# Patient Record
Sex: FEMALE | Race: WHITE | Employment: UNEMPLOYED | URBAN - NONMETROPOLITAN AREA
[De-identification: names, ages, dates, MRNs, and addresses within clinical notes are randomized per-mention and may not be internally consistent; named-entity substitution may affect disease eponyms.]

---

## 2021-10-01 ENCOUNTER — HOSPITAL ENCOUNTER (INPATIENT)
Age: 36
LOS: 10 days | Discharge: NURSING, REHAB OR LONG-TERM CARE | DRG: 751 | End: 2021-10-11
Attending: PSYCHIATRY & NEUROLOGY | Admitting: PSYCHIATRY & NEUROLOGY
Payer: MEDICAID

## 2021-10-01 DIAGNOSIS — G89.29 OTHER CHRONIC PAIN: ICD-10-CM

## 2021-10-01 DIAGNOSIS — F33.2 SEVERE EPISODE OF RECURRENT MAJOR DEPRESSIVE DISORDER, WITHOUT PSYCHOTIC FEATURES (HCC): ICD-10-CM

## 2021-10-01 DIAGNOSIS — F41.1 GAD (GENERALIZED ANXIETY DISORDER): ICD-10-CM

## 2021-10-01 DIAGNOSIS — F98.8 ATTENTION DEFICIT DISORDER (ADD) WITHOUT HYPERACTIVITY: Primary | ICD-10-CM

## 2021-10-01 PROBLEM — R56.9 SEIZURES, GENERALIZED CONVULSIVE (HCC): Status: ACTIVE | Noted: 2021-10-01

## 2021-10-01 PROBLEM — F32.A DEPRESSION WITH SUICIDAL IDEATION: Status: ACTIVE | Noted: 2021-10-01

## 2021-10-01 PROBLEM — R45.851 DEPRESSION WITH SUICIDAL IDEATION: Status: ACTIVE | Noted: 2021-10-01

## 2021-10-01 PROCEDURE — 74011250637 HC RX REV CODE- 250/637: Performed by: PSYCHIATRY & NEUROLOGY

## 2021-10-01 PROCEDURE — 65220000003 HC RM SEMIPRIVATE PSYCH

## 2021-10-01 RX ORDER — LEVETIRACETAM 250 MG/1
250 TABLET ORAL 2 TIMES DAILY
COMMUNITY
End: 2021-10-11

## 2021-10-01 RX ORDER — BENZTROPINE MESYLATE 1 MG/1
1 TABLET ORAL
Status: DISCONTINUED | OUTPATIENT
Start: 2021-10-01 | End: 2021-10-01 | Stop reason: CLARIF

## 2021-10-01 RX ORDER — BUSPIRONE HYDROCHLORIDE 10 MG/1
10 TABLET ORAL 2 TIMES DAILY
Status: DISCONTINUED | OUTPATIENT
Start: 2021-10-01 | End: 2021-10-11 | Stop reason: HOSPADM

## 2021-10-01 RX ORDER — OXCARBAZEPINE 150 MG/1
300 TABLET, FILM COATED ORAL 2 TIMES DAILY
COMMUNITY

## 2021-10-01 RX ORDER — OLANZAPINE 2.5 MG/1
5 TABLET ORAL
Status: DISCONTINUED | OUTPATIENT
Start: 2021-10-01 | End: 2021-10-11 | Stop reason: HOSPADM

## 2021-10-01 RX ORDER — QUETIAPINE FUMARATE 200 MG/1
400 TABLET, FILM COATED ORAL
Status: DISCONTINUED | OUTPATIENT
Start: 2021-10-01 | End: 2021-10-11 | Stop reason: HOSPADM

## 2021-10-01 RX ORDER — MIRTAZAPINE 15 MG/1
30 TABLET, FILM COATED ORAL
Status: COMPLETED | OUTPATIENT
Start: 2021-10-01 | End: 2021-10-07

## 2021-10-01 RX ORDER — CLONIDINE HYDROCHLORIDE 0.1 MG/1
0.1 TABLET ORAL
COMMUNITY

## 2021-10-01 RX ORDER — ACETAMINOPHEN 325 MG/1
650 TABLET ORAL
Status: DISCONTINUED | OUTPATIENT
Start: 2021-10-01 | End: 2021-10-11 | Stop reason: HOSPADM

## 2021-10-01 RX ORDER — BENZTROPINE MESYLATE 0.5 MG/1
0.5 TABLET ORAL 2 TIMES DAILY
Status: COMPLETED | OUTPATIENT
Start: 2021-10-01 | End: 2021-10-07

## 2021-10-01 RX ORDER — GABAPENTIN 300 MG/1
600 CAPSULE ORAL 3 TIMES DAILY
Status: COMPLETED | OUTPATIENT
Start: 2021-10-01 | End: 2021-10-08

## 2021-10-01 RX ORDER — CLONAZEPAM 1 MG/1
1 TABLET ORAL 2 TIMES DAILY
Status: DISCONTINUED | OUTPATIENT
Start: 2021-10-01 | End: 2021-10-11 | Stop reason: HOSPADM

## 2021-10-01 RX ORDER — HYDROXYZINE 25 MG/1
50 TABLET, FILM COATED ORAL
Status: DISCONTINUED | OUTPATIENT
Start: 2021-10-01 | End: 2021-10-11 | Stop reason: HOSPADM

## 2021-10-01 RX ORDER — GABAPENTIN 600 MG/1
600 TABLET ORAL 3 TIMES DAILY
COMMUNITY
End: 2021-10-11

## 2021-10-01 RX ORDER — QUETIAPINE FUMARATE 25 MG/1
25 TABLET, FILM COATED ORAL DAILY
COMMUNITY
End: 2021-10-11

## 2021-10-01 RX ORDER — ADHESIVE BANDAGE
30 BANDAGE TOPICAL DAILY PRN
Status: DISCONTINUED | OUTPATIENT
Start: 2021-10-01 | End: 2021-10-11 | Stop reason: HOSPADM

## 2021-10-01 RX ORDER — QUETIAPINE FUMARATE 400 MG/1
400 TABLET, FILM COATED ORAL
COMMUNITY
End: 2021-10-11

## 2021-10-01 RX ORDER — LEVETIRACETAM 250 MG/1
250 TABLET ORAL 2 TIMES DAILY
Status: DISCONTINUED | OUTPATIENT
Start: 2021-10-01 | End: 2021-10-11 | Stop reason: HOSPADM

## 2021-10-01 RX ORDER — DEXTROAMPHETAMINE SACCHARATE, AMPHETAMINE ASPARTATE, DEXTROAMPHETAMINE SULFATE AND AMPHETAMINE SULFATE 2.5; 2.5; 2.5; 2.5 MG/1; MG/1; MG/1; MG/1
20 TABLET ORAL 3 TIMES DAILY
Status: DISCONTINUED | OUTPATIENT
Start: 2021-10-01 | End: 2021-10-02 | Stop reason: DRUGHIGH

## 2021-10-01 RX ORDER — LORAZEPAM 2 MG/ML
1 INJECTION INTRAMUSCULAR
Status: DISCONTINUED | OUTPATIENT
Start: 2021-10-01 | End: 2021-10-11 | Stop reason: HOSPADM

## 2021-10-01 RX ORDER — BENZTROPINE MESYLATE 0.5 MG/1
0.5 TABLET ORAL 2 TIMES DAILY
COMMUNITY

## 2021-10-01 RX ORDER — TRAZODONE HYDROCHLORIDE 50 MG/1
50 TABLET ORAL
Status: DISCONTINUED | OUTPATIENT
Start: 2021-10-01 | End: 2021-10-11 | Stop reason: HOSPADM

## 2021-10-01 RX ORDER — DEXTROAMPHETAMINE SACCHARATE, AMPHETAMINE ASPARTATE, DEXTROAMPHETAMINE SULFATE AND AMPHETAMINE SULFATE 5; 5; 5; 5 MG/1; MG/1; MG/1; MG/1
20 TABLET ORAL 3 TIMES DAILY
COMMUNITY
End: 2021-10-11

## 2021-10-01 RX ORDER — IBUPROFEN 200 MG
1 TABLET ORAL DAILY
Status: DISCONTINUED | OUTPATIENT
Start: 2021-10-01 | End: 2021-10-11 | Stop reason: HOSPADM

## 2021-10-01 RX ORDER — DIPHENHYDRAMINE HYDROCHLORIDE 50 MG/ML
50 INJECTION, SOLUTION INTRAMUSCULAR; INTRAVENOUS
Status: DISCONTINUED | OUTPATIENT
Start: 2021-10-01 | End: 2021-10-11 | Stop reason: HOSPADM

## 2021-10-01 RX ADMIN — BUSPIRONE HYDROCHLORIDE 10 MG: 10 TABLET ORAL at 14:57

## 2021-10-01 RX ADMIN — MIRTAZAPINE 30 MG: 15 TABLET, FILM COATED ORAL at 21:11

## 2021-10-01 RX ADMIN — QUETIAPINE FUMARATE 400 MG: 200 TABLET ORAL at 21:11

## 2021-10-01 RX ADMIN — DEXTROAMPHETAMINE SACCHARATE, AMPHETAMINE ASPARTATE, DEXTROAMPHETAMINE SULFATE, AMPHETAMINE SULFATE TABLETS, 10 MG,CLL 20 MG: 2.5; 2.5; 2.5; 2.5 TABLET ORAL at 16:02

## 2021-10-01 RX ADMIN — BENZTROPINE MESYLATE 0.5 MG: 0.5 TABLET ORAL at 14:56

## 2021-10-01 RX ADMIN — LEVETIRACETAM 250 MG: 250 TABLET ORAL at 21:11

## 2021-10-01 RX ADMIN — CLONAZEPAM 1 MG: 1 TABLET ORAL at 21:11

## 2021-10-01 RX ADMIN — LEVETIRACETAM 250 MG: 250 TABLET ORAL at 15:00

## 2021-10-01 RX ADMIN — GABAPENTIN 600 MG: 300 CAPSULE ORAL at 21:11

## 2021-10-01 RX ADMIN — GABAPENTIN 600 MG: 300 CAPSULE ORAL at 14:56

## 2021-10-01 RX ADMIN — BENZTROPINE MESYLATE 0.5 MG: 0.5 TABLET ORAL at 21:11

## 2021-10-01 RX ADMIN — CLONAZEPAM 1 MG: 1 TABLET ORAL at 14:57

## 2021-10-01 RX ADMIN — DEXTROAMPHETAMINE SACCHARATE, AMPHETAMINE ASPARTATE, DEXTROAMPHETAMINE SULFATE, AMPHETAMINE SULFATE TABLETS, 10 MG,CLL 20 MG: 2.5; 2.5; 2.5; 2.5 TABLET ORAL at 21:10

## 2021-10-01 RX ADMIN — BUSPIRONE HYDROCHLORIDE 10 MG: 10 TABLET ORAL at 21:11

## 2021-10-01 RX ADMIN — ACETAMINOPHEN 650 MG: 325 TABLET ORAL at 23:39
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
## 2021-10-02 LAB
CHOLEST SERPL-MCNC: 162 MG/DL
EST. AVERAGE GLUCOSE BLD GHB EST-MCNC: 97 MG/DL
HBA1C MFR BLD: 5 % (ref 4–5.6)
HDLC SERPL-MCNC: 53 MG/DL
HDLC SERPL: 3.1 {RATIO} (ref 0–5)
LDLC SERPL CALC-MCNC: 76 MG/DL (ref 0–100)
LIPID PROFILE,FLP: ABNORMAL
TRIGL SERPL-MCNC: 165 MG/DL (ref ?–150)
VLDLC SERPL CALC-MCNC: 33 MG/DL

## 2021-10-02 PROCEDURE — 74011250637 HC RX REV CODE- 250/637: Performed by: INTERNAL MEDICINE

## 2021-10-02 PROCEDURE — 83036 HEMOGLOBIN GLYCOSYLATED A1C: CPT

## 2021-10-02 PROCEDURE — 65220000003 HC RM SEMIPRIVATE PSYCH

## 2021-10-02 PROCEDURE — 36415 COLL VENOUS BLD VENIPUNCTURE: CPT

## 2021-10-02 PROCEDURE — 80061 LIPID PANEL: CPT

## 2021-10-02 PROCEDURE — 74011250637 HC RX REV CODE- 250/637: Performed by: PSYCHIATRY & NEUROLOGY

## 2021-10-02 RX ORDER — BUTALBITAL, ACETAMINOPHEN AND CAFFEINE 50; 325; 40 MG/1; MG/1; MG/1
2 TABLET ORAL
Status: COMPLETED | OUTPATIENT
Start: 2021-10-02 | End: 2021-10-02

## 2021-10-02 RX ORDER — NAPROXEN 250 MG/1
500 TABLET ORAL
Status: DISCONTINUED | OUTPATIENT
Start: 2021-10-02 | End: 2021-10-03

## 2021-10-02 RX ADMIN — CLONAZEPAM 1 MG: 1 TABLET ORAL at 21:11

## 2021-10-02 RX ADMIN — BENZTROPINE MESYLATE 0.5 MG: 0.5 TABLET ORAL at 21:11

## 2021-10-02 RX ADMIN — CLONAZEPAM 1 MG: 1 TABLET ORAL at 07:43

## 2021-10-02 RX ADMIN — HYDROXYZINE HYDROCHLORIDE 50 MG: 25 TABLET, FILM COATED ORAL at 14:41

## 2021-10-02 RX ADMIN — BUTALBITAL, ACETAMINOPHEN, AND CAFFEINE 2 TABLET: 50; 325; 40 TABLET ORAL at 15:41

## 2021-10-02 RX ADMIN — ACETAMINOPHEN 650 MG: 325 TABLET ORAL at 08:08

## 2021-10-02 RX ADMIN — ACETAMINOPHEN 650 MG: 325 TABLET ORAL at 13:18

## 2021-10-02 RX ADMIN — DEXTROAMPHETAMINE SACCHARATE, AMPHETAMINE ASPARTATE, DEXTROAMPHETAMINE SULFATE, AMPHETAMINE SULFATE TABLETS, 10 MG,CLL 20 MG: 2.5; 2.5; 2.5; 2.5 TABLET ORAL at 15:43

## 2021-10-02 RX ADMIN — BUSPIRONE HYDROCHLORIDE 10 MG: 10 TABLET ORAL at 21:10

## 2021-10-02 RX ADMIN — GABAPENTIN 600 MG: 300 CAPSULE ORAL at 21:10

## 2021-10-02 RX ADMIN — GABAPENTIN 600 MG: 300 CAPSULE ORAL at 07:43

## 2021-10-02 RX ADMIN — GABAPENTIN 600 MG: 300 CAPSULE ORAL at 15:42

## 2021-10-02 RX ADMIN — MIRTAZAPINE 30 MG: 15 TABLET, FILM COATED ORAL at 21:11

## 2021-10-02 RX ADMIN — LEVETIRACETAM 250 MG: 250 TABLET ORAL at 21:10

## 2021-10-02 RX ADMIN — BENZTROPINE MESYLATE 0.5 MG: 0.5 TABLET ORAL at 07:44

## 2021-10-02 RX ADMIN — QUETIAPINE FUMARATE 400 MG: 200 TABLET ORAL at 21:11

## 2021-10-02 RX ADMIN — LEVETIRACETAM 250 MG: 250 TABLET ORAL at 07:44

## 2021-10-02 RX ADMIN — BUSPIRONE HYDROCHLORIDE 10 MG: 10 TABLET ORAL at 07:45

## 2021-10-02 NOTE — BH NOTES
Pt since 2300 was given at least 4 sanitary pads,  She claimed having   Heavy mesturation. I checks the trashed pads she was putting in the trash, no blood seen just some blood drops scant on one pad the rest of the two pads there is no blood at all in. Pt behavior is under observation.

## 2021-10-02 NOTE — BH NOTES
Pt has been out of room in groups room today. Up to nurses's station numerous times making requests for meds, drinks and to make calls to [facility]. Pt has asked for increasing amount of sanitary pads although she cannot show staff that she is bleeding heavily. Has complained of headache much of the day and was seen by Dr Demetrius Garcia and meds were ordered. Safe on unit will continue to monitor.

## 2021-10-02 NOTE — PROGRESS NOTES
Pt is progressing toward her goals. Denies suicidal and homicidal thoughts, says depression and anxiety is improving. Compliant with meds, and attending groups. No seizure activity noted. Safe on unit.

## 2021-10-02 NOTE — PROGRESS NOTES
Problem: Falls - Risk of  Goal: *Absence of Falls  Description: Document Burns Reason Fall Risk and appropriate interventions in the flowsheet.   Outcome: Progressing Towards Goal  Note: Fall Risk Interventions:            Medication Interventions: Teach patient to arise slowly

## 2021-10-02 NOTE — BH NOTES
Pt received in her room socializing with her roommate, smiling, euthymic mood,  Denies SI/HI, Denies hallucinations, describe d her mood as oK. Pt attended. group rated depression as 5 and anxiety as 8, ate snack, reported by staff that pt doesn't like red meat on her meals and snacks( ordered for diet and snack modified and a comment added addressing her preferences). Pt later asking for Ensure  After snacks. because she missed her lunch, directed to discuss that with doctor as pt has  Demanding behaviors for her needs but in polite manner. Remained after group  In activity room watching Tv and socilaizing and talking to her roommate  Till almost 65  Went to her room, demand for gown feeling cold, then asked for underwear and  Sanitary pads saying she is on her period. she reported having L arm implant for birth control. At [id number] requested and given po prn Tyelnol 650mg for abdominal amenstrual cramps before taking the Tylenol she asked if we give Ultram or advil if this doesn't work, directed to discuss her medications with the doctor when will be seen by the doctor, also encouraged to sleep she said she will be up as she slept a lot in the other hospital before coming here. .  Current;y she is awake sitting in bed.

## 2021-10-02 NOTE — BH NOTES
B: Pt is awake and alert this morning, pleasant and cooperative with staff and peers. Denies suicidal and homicidal thoughts, rates depression and anxiety at 5/10. Eating and sleeping well, compliant with meds. No seizure activity noted. Safe on unit. I: Maintain a safe environment for pt, safety cks q 15 mins and prn, Encourage groups and give meds as ordered. Monitor hours of sleep at night and amount of food consumed at meals. R: Pt is pleasant with staff and peers. States she is doing well this morning. Denies suicidal and homicidal thoughts. Safe on unit. P: Continue to monitor, give meds as ordered, encourage groups.
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
## 2021-10-02 NOTE — BH NOTES
Pt start sleeping by 0200 am remained sleeping as of this time. No violent, no slef harming behaviors, no seizure like activity noticed or reported.

## 2021-10-03 PROCEDURE — 74011250637 HC RX REV CODE- 250/637: Performed by: PSYCHIATRY & NEUROLOGY

## 2021-10-03 PROCEDURE — 65220000003 HC RM SEMIPRIVATE PSYCH

## 2021-10-03 RX ORDER — DEXTROAMPHETAMINE SACCHARATE, AMPHETAMINE ASPARTATE, DEXTROAMPHETAMINE SULFATE AND AMPHETAMINE SULFATE 2.5; 2.5; 2.5; 2.5 MG/1; MG/1; MG/1; MG/1
20 TABLET ORAL 2 TIMES DAILY
Status: DISCONTINUED | OUTPATIENT
Start: 2021-10-03 | End: 2021-10-11 | Stop reason: HOSPADM

## 2021-10-03 RX ORDER — NAPROXEN 375 MG/1
375 TABLET ORAL
Status: DISCONTINUED | OUTPATIENT
Start: 2021-10-03 | End: 2021-10-11 | Stop reason: HOSPADM

## 2021-10-03 RX ADMIN — LEVETIRACETAM 250 MG: 250 TABLET ORAL at 08:28

## 2021-10-03 RX ADMIN — QUETIAPINE FUMARATE 400 MG: 200 TABLET ORAL at 21:18

## 2021-10-03 RX ADMIN — CLONAZEPAM 1 MG: 1 TABLET ORAL at 21:20

## 2021-10-03 RX ADMIN — BUSPIRONE HYDROCHLORIDE 10 MG: 10 TABLET ORAL at 21:19

## 2021-10-03 RX ADMIN — GABAPENTIN 600 MG: 300 CAPSULE ORAL at 15:23

## 2021-10-03 RX ADMIN — BENZTROPINE MESYLATE 0.5 MG: 0.5 TABLET ORAL at 08:28

## 2021-10-03 RX ADMIN — GABAPENTIN 600 MG: 300 CAPSULE ORAL at 08:29

## 2021-10-03 RX ADMIN — LEVETIRACETAM 250 MG: 250 TABLET ORAL at 21:19

## 2021-10-03 RX ADMIN — TRAZODONE HYDROCHLORIDE 50 MG: 50 TABLET ORAL at 23:13

## 2021-10-03 RX ADMIN — MIRTAZAPINE 30 MG: 15 TABLET, FILM COATED ORAL at 21:20

## 2021-10-03 RX ADMIN — BUSPIRONE HYDROCHLORIDE 10 MG: 10 TABLET ORAL at 08:29

## 2021-10-03 RX ADMIN — CLONAZEPAM 1 MG: 1 TABLET ORAL at 08:29

## 2021-10-03 RX ADMIN — BENZTROPINE MESYLATE 0.5 MG: 0.5 TABLET ORAL at 21:19

## 2021-10-03 RX ADMIN — DEXTROAMPHETAMINE SACCHARATE, AMPHETAMINE ASPARTATE, DEXTROAMPHETAMINE SULFATE, AMPHETAMINE SULFATE TABLETS, 10 MG,CLL 20 MG: 2.5; 2.5; 2.5; 2.5 TABLET ORAL at 09:09

## 2021-10-03 RX ADMIN — GABAPENTIN 600 MG: 300 CAPSULE ORAL at 21:18

## 2021-10-03 RX ADMIN — DEXTROAMPHETAMINE SACCHARATE, AMPHETAMINE ASPARTATE, DEXTROAMPHETAMINE SULFATE, AMPHETAMINE SULFATE TABLETS, 10 MG,CLL 20 MG: 2.5; 2.5; 2.5; 2.5 TABLET ORAL at 13:22

## 2021-10-03 NOTE — PROGRESS NOTES
Pt is progressing toward her goals. Denies suicidal and homicidal thoughts. States she is not depressed but is anxious about being here. Compliant with meds and is attending groups. Safe on unit.

## 2021-10-03 NOTE — BH NOTES
B: Pt is awake and alert this morning. Out to nurse's station with angry mood and and states she wants to be discharged. Pt states she has no home to go to but can fly to [place] or [facility] gap go to [employer]". Explained to pt that we cannot discharge her without a doctor's order and no plan as to where she will go. Explained that Dr Saida Fiore with probably order a prescreen on her. Pt irritable at states \"Just forget it\"! Denies suicidal and homicidal thoughts, says she is feeling trapped here. Compliant with meds, irritable with staff, safe on unit. I: Maintain a safe environment for pt, safety cks q 15 mins and prn. Encourage groups and give meds as ordered. R: Pt is irritable and manipulative with staff and peers. Turned coffee over on tray and   then asked staff to call dietary to bring more. Asking staff to give her meds at 0600 because peer receives med at this time. Pt redirected, angry and went to her room. Safe on unit. P: Continue to monitor, encourage groups, give meds as ordered.

## 2021-10-03 NOTE — BH NOTES
Pt has been out and about on unit today. Continues to make numerous requests and frequently comes to nurse's starion to ask questions. Denies suicidal and homicidal thoughts, safe on unit.

## 2021-10-03 NOTE — BH NOTES
B: Pt alert and oriented x4; Pt denied feeling dep, anxious, SI/HI, and A/V/H. Pt cooperative with assessment but noted very needy and intrusive. During reviewing her medications, noted Adderall 20mg scheduled for TID and last dose to be given at 2200. T/C to Dr. Shoaib Franklin and medication was changed to Adderall 10mg BID. Explained to pt which cause her to because somewhat upset because she felt has though she may have withdrawal symptoms because the dose was decrease and proceeded to question if she could leave because she was voluntary and could I do to make that happen. Explained to give me a few mins and I would contact Dr. Shoaib Franklin. T/C Shoaib Franklin and explained the situation and he immediately changed the order to Adderall 20mg BID. Spoke with pt and she felt better and would stay. Pt monitored [id number]     I: Encourage pt to attend and participate in all groups and wrap up; Administer medication as ordered and  needed. Encourage pt be up for all meals and snacks. Encourage pt to interact with staff/peers in a positive manner; Continue q15 min safety checks     R: Pt completed  group and participated, consumed snack. Pt interacts well with all her peers and staff. Pt can be very intrusive with roommate, note letting her speak up for herself. Pt medication administer. Denies any pain Pt monitored [id number]     P: Pt will develop and utilize positive coping skills. Pt monitored [id number]    2230: pt comes to the nursing station asking if her Klonopn 1mg could be changed to breakfast and dinner because she has more anxiety between those times. Expressed the Klonopin is still in her system. Then pt stated \" I have migraines and it's causing my stomach to because upset and my legs.  Asked didn't she want Tylenol and she just walked away saying \"nevermind\"    0500: pt slept throughout the night without any issue

## 2021-10-04 PROCEDURE — 65220000003 HC RM SEMIPRIVATE PSYCH

## 2021-10-04 PROCEDURE — 74011250637 HC RX REV CODE- 250/637: Performed by: PSYCHIATRY & NEUROLOGY

## 2021-10-04 RX ADMIN — GABAPENTIN 600 MG: 300 CAPSULE ORAL at 15:52

## 2021-10-04 RX ADMIN — GABAPENTIN 600 MG: 300 CAPSULE ORAL at 21:13

## 2021-10-04 RX ADMIN — LEVETIRACETAM 250 MG: 250 TABLET ORAL at 21:13

## 2021-10-04 RX ADMIN — BUSPIRONE HYDROCHLORIDE 10 MG: 10 TABLET ORAL at 21:14

## 2021-10-04 RX ADMIN — NAPROXEN 375 MG: 375 TABLET ORAL at 13:36

## 2021-10-04 RX ADMIN — BUSPIRONE HYDROCHLORIDE 10 MG: 10 TABLET ORAL at 08:32

## 2021-10-04 RX ADMIN — DEXTROAMPHETAMINE SACCHARATE, AMPHETAMINE ASPARTATE, DEXTROAMPHETAMINE SULFATE, AMPHETAMINE SULFATE TABLETS, 10 MG,CLL 20 MG: 2.5; 2.5; 2.5; 2.5 TABLET ORAL at 13:35

## 2021-10-04 RX ADMIN — CLONAZEPAM 1 MG: 1 TABLET ORAL at 21:14

## 2021-10-04 RX ADMIN — QUETIAPINE FUMARATE 400 MG: 200 TABLET ORAL at 21:13

## 2021-10-04 RX ADMIN — MIRTAZAPINE 30 MG: 15 TABLET, FILM COATED ORAL at 21:14

## 2021-10-04 RX ADMIN — DEXTROAMPHETAMINE SACCHARATE, AMPHETAMINE ASPARTATE, DEXTROAMPHETAMINE SULFATE, AMPHETAMINE SULFATE TABLETS, 10 MG,CLL 20 MG: 2.5; 2.5; 2.5; 2.5 TABLET ORAL at 08:32

## 2021-10-04 RX ADMIN — HYDROXYZINE HYDROCHLORIDE 50 MG: 25 TABLET, FILM COATED ORAL at 11:02

## 2021-10-04 RX ADMIN — GABAPENTIN 600 MG: 300 CAPSULE ORAL at 08:32

## 2021-10-04 RX ADMIN — BENZTROPINE MESYLATE 0.5 MG: 0.5 TABLET ORAL at 21:13

## 2021-10-04 RX ADMIN — LEVETIRACETAM 250 MG: 250 TABLET ORAL at 08:32

## 2021-10-04 RX ADMIN — BENZTROPINE MESYLATE 0.5 MG: 0.5 TABLET ORAL at 08:32

## 2021-10-04 RX ADMIN — CLONAZEPAM 1 MG: 1 TABLET ORAL at 08:32

## 2021-10-04 NOTE — PROGRESS NOTES
Problem: Depressed Mood (Adult/Pediatric)  Goal: *STG: Participates in treatment plan  Outcome: Progressing Towards Goal  Goal: *STG: Attends activities and groups  Outcome: Progressing Towards Goal  Goal: *STG: Remains safe in hospital  Outcome: Progressing Towards Goal  Goal: *STG: Complies with medication therapy  Outcome: Progressing Towards Goal     Problem: Depressed Mood (Adult/Pediatric)  Goal: *LTG: Understands illness and can identify signs of relapse  Outcome: Not Progressing Towards Goal     Problem: Anxiety  Goal: *Alleviation of anxiety  Outcome: Not Progressing Towards Goal
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
## 2021-10-04 NOTE — BH NOTES
Pt. Alert and oriented x 3. Physical Assessment was done et wnl. Pt. C/o headache. Rated headache 4/10. Pt. Relaxing, reading a book. Pt. Calm et cooperative. Pt. States depression is a 4/10. Pt. States anxiety is 7/10. Pt. Denies having  hallucinations. Pt. Denies SI/HI. I: Administer medications as ordered and needed, Encourage pt to attend and participate in groups, encourage pt to be up for all meals and snacks, consuming all each time, encourage pt to interact with peers in a positive manner. Q 15 minute safety checks continue. R: Pt. Is compliant with medications. does attend groups, does participate. Pt. is getting up for meals, consumes 100% of meals, snacks. Pt. does interact with peers. no safety concerns at this time. P:Will cont. To monitor q15min. Checks for safety. Administer medications as ordered et needed. Will cont. To encourage group attendance et participation. Will cont. To observe pt. For signs of  Outbursts. Will cont. To encourage pt. To use her coping skills.

## 2021-10-04 NOTE — BH NOTES
B: Pt alert and oriented x4; Noted pacing the hallway at a fast rate, upset, crying because she couldn't have her Klonopin early and slammed the bedroom door around 1900. Pt states dep 7/10, anxiety 9/10 because she needs her Klonopin, denies SI/HI, and A/V/H. Dr. Shoaib Franklin came in to visit pt. Pt requested her PRN medication list to be printed, explained that wasn't possible. Pt cooperative with assessment but noted very needy. Adderall order clarified today. Pt monitored [id number]     I: Encourage pt to attend and participate in all groups and wrap up; Administer medication as ordered and  needed. Encourage pt be up for all meals and snacks. Encourage pt to interact with staff/peers in a positive manner; Continue q15 min safety checks     R: Pt completed and partial participated, consumed snack. Pt interacts well with all her peers and staff. Pt can be very intrusive with roommate, note letting her speak up for herself. Pt medication administer.  Denies any pain Pt monitored [id number]     P: Pt will develop and utilize positive coping skills. Pt monitored [id number]      2315: pt c/o not sleeping and requested Trazodone 50mg for sleep    0500: pt slept through the night
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
## 2021-10-04 NOTE — PROGRESS NOTES
Discussed case interviewed patient  Taking and tolerating medications well  Reports still feeling depressed and anxious  Affect is labile  Reports feeling sad about anniversaries  Feels almost and no place to go  Important relationships ended  Does not see the role of substance abuse in her problems much  Reports feeling sad depressed  Denies acute psychotic symptoms    Mental status exam  Alert oriented fair eye contact  Speech is goal-directed soft tone  Mood is depressed  Tearful sad  Unable to pledge for safety outside the hospital  Insight and judgment fair    Recommendations  Patient appears generally sad depressed anxious  Unfortunately does not see the role of substance abuse in her difficulties as much  Medication regimen reviewed  Follow-up with me in the team again tomorrow

## 2021-10-04 NOTE — BH NOTES
Pt. Has been visible on the unit today, ambulating in the halls. Pt. Ate meals in the [address] Room. Pt. Attended et participated in group meetings. Pt. Interacted well with staff et peers. Will cont. To monitor q15min. Checks for safety.

## 2021-10-04 NOTE — PROGRESS NOTES
Pt up to nurses station, requesting \"nasal spray\". Pt states she get nasal congestion when lying down, states she has had this for a long time and uses \"generic Afrin\" at home. Pt denies prescription. Pt denies cold symptoms, states congestion is position dependant and occurs when lying down. Hospitalist called, awaiting answer.

## 2021-10-05 PROBLEM — R45.851 SUICIDAL IDEATION: Status: ACTIVE | Noted: 2021-10-05

## 2021-10-05 PROBLEM — F41.1 GAD (GENERALIZED ANXIETY DISORDER): Status: ACTIVE | Noted: 2021-10-05

## 2021-10-05 PROBLEM — F33.2 SEVERE EPISODE OF RECURRENT MAJOR DEPRESSIVE DISORDER (HCC): Status: ACTIVE | Noted: 2021-10-05

## 2021-10-05 PROCEDURE — 65220000003 HC RM SEMIPRIVATE PSYCH

## 2021-10-05 PROCEDURE — 74011250637 HC RX REV CODE- 250/637: Performed by: PSYCHIATRY & NEUROLOGY

## 2021-10-05 RX ADMIN — DEXTROAMPHETAMINE SACCHARATE, AMPHETAMINE ASPARTATE, DEXTROAMPHETAMINE SULFATE, AMPHETAMINE SULFATE TABLETS, 10 MG,CLL 20 MG: 2.5; 2.5; 2.5; 2.5 TABLET ORAL at 13:28

## 2021-10-05 RX ADMIN — BENZTROPINE MESYLATE 0.5 MG: 0.5 TABLET ORAL at 21:15

## 2021-10-05 RX ADMIN — DEXTROAMPHETAMINE SACCHARATE, AMPHETAMINE ASPARTATE, DEXTROAMPHETAMINE SULFATE, AMPHETAMINE SULFATE TABLETS, 10 MG,CLL 20 MG: 2.5; 2.5; 2.5; 2.5 TABLET ORAL at 08:27

## 2021-10-05 RX ADMIN — QUETIAPINE FUMARATE 400 MG: 200 TABLET ORAL at 21:15

## 2021-10-05 RX ADMIN — NAPROXEN 375 MG: 375 TABLET ORAL at 12:59

## 2021-10-05 RX ADMIN — CLONAZEPAM 1 MG: 1 TABLET ORAL at 08:27

## 2021-10-05 RX ADMIN — CLONAZEPAM 1 MG: 1 TABLET ORAL at 21:15

## 2021-10-05 RX ADMIN — BUSPIRONE HYDROCHLORIDE 10 MG: 10 TABLET ORAL at 08:26

## 2021-10-05 RX ADMIN — GABAPENTIN 600 MG: 300 CAPSULE ORAL at 08:26

## 2021-10-05 RX ADMIN — LEVETIRACETAM 250 MG: 250 TABLET ORAL at 21:15

## 2021-10-05 RX ADMIN — BUSPIRONE HYDROCHLORIDE 10 MG: 10 TABLET ORAL at 21:15

## 2021-10-05 RX ADMIN — GABAPENTIN 600 MG: 300 CAPSULE ORAL at 21:14

## 2021-10-05 RX ADMIN — GABAPENTIN 600 MG: 300 CAPSULE ORAL at 15:48

## 2021-10-05 RX ADMIN — LEVETIRACETAM 250 MG: 250 TABLET ORAL at 08:26

## 2021-10-05 RX ADMIN — MIRTAZAPINE 30 MG: 15 TABLET, FILM COATED ORAL at 21:15

## 2021-10-05 RX ADMIN — BENZTROPINE MESYLATE 0.5 MG: 0.5 TABLET ORAL at 08:26

## 2021-10-05 RX ADMIN — HYDROXYZINE HYDROCHLORIDE 50 MG: 25 TABLET, FILM COATED ORAL at 17:37

## 2021-10-05 NOTE — BH NOTES
Pt received inactivity room watching TV, then noticed walking in hallway like excising , then joined group at group time ,Pt socializing with her roommate, smiling, euthymic mood,  Denies SI/HI, Denies hallucinations. At [facility] came asking for her medications to be given early at 2000 informed that when medication prepared as usual for all patient will be distributed  At [facility] medication time by 2100. She walked away voice no issues.       Pt attended. group rated depression as10 and anxiety as10, ate snack,Remained after group  In activity room watching Tv and socilaizing and talking to her roommate  Till almost 200 and to staff. at 2113 accepted her medication , without issue. after HS medication involved herself in reading while in bed,    No compliants made this shift as of this time. start sleeping by 2230. No violent no self harming behavior noticed or reported.

## 2021-10-05 NOTE — BH NOTES
Behavioral Health Treatment Team Note     Patient goal(s) for today: Contact father  Treatment team focus/goals: Engage in treatment, monitor medications, work for safe discharge    Progress note: Pt is a voluntary admission. Pt is AO x 4, with SI, denies HI, denies AVH, cooperative. Pt has good eye contact. Mood is depressed. Affect is depressed. Reports anxiety and depression 5/10. Thinking is logical. Pt reports she does not have a strong support system and expresses she does not feel the need for social relationships outside of her daughter. Pt reports she has been binging on methamphetamines. SW discussed treatment options and pt did not want to go to another treatment facility at this time. Pt is participating in groups and interacting well with other pts. Will continue to monitor medications and engage in treatment.      LOS:  4  Expected LOS: 7    Insurance info/prescription coverage:  [address]  Date of last family contact:  None  Family requesting physician contact today:  no  Discharge plan:  Crisis stabilization  Guns in the home:  no   Outpatient provider(s):  [facility] [employer]

## 2021-10-05 NOTE — PROGRESS NOTES
Patient actively participated in Spirituality Group about devotional time with God and how one starts their day; planting seeds and leaving a legacy learning from the experiences of our forefathers and mothers on [facility] unit. Rev.  Keara Lemons 31, [address]

## 2021-10-05 NOTE — GROUP NOTE
QUETA  GROUP DOCUMENTATION INDIVIDUAL                                                                          Group Therapy Note    Date: 10/4/2021    Group Start Time: 1400  Group End Time: 1500  Group Topic: Education Group - Inpatient    [facility] [address] GROUP DOCUMENTATION GROUP    Group Therapy Note    Group members participated in psychoeducational group on crisis planning. SW provided psychoeducation and informational handouts on signs of illness, crisis, coping strategies, and resources. Group members were introduced to the concept of a safety plan. Attendance: Attended    Patient's Goal:  Attendance    Interventions/techniques: Informed, Validated and Supported    Follows Directions:  Followed directions    Interactions: Interacted appropriately    Mental Status: Calm and Congruent    Behavior/appearance: Attentive and Cooperative    Goals Achieved: Able to engage in interactions and Able to listen to others          ROSIE Perea, Supervisee in Social Work

## 2021-10-05 NOTE — BH NOTES
Pt. Alert and oriented x 4. Pt. States depression is a 0. Pt. States anxiety is 4. Pt. Denies hallucinations. Denies SI/HI. Cooperative with assessment. Pt.  States she is doing \"okay\", Pt. Had episode where she got frustrated with peer being loud, went to her room and slammed her door. Upon writer checking on pt. She states she was trying to read and she couldn't hear herself think, and it got her agitated, and she realizes this is something she needs to work on as far as coping skills. I: Administer medications as ordered and needed, Encourage pt to attend and participate in groups, encourage pt to be up for all meals and snacks, consuming all each time, encourage pt to interact with peers in a positive manner. Q 15 minute safety checks continue. R: Compliant with medications. does attend groups, does participate. Pt. is getting up for meals, consumes all of meals, snacks. Pt. does interact with peers. no safety concerns at this time. P: Pt. Will continue to utilize positive coping skills.

## 2021-10-05 NOTE — PROGRESS NOTES
Problem: Falls - Risk of  Goal: *Absence of Falls  Description: Document Marley Staley Fall Risk and appropriate interventions in the flowsheet. Outcome: Progressing Towards Goal  Note: Fall Risk Interventions:            Medication Interventions: Teach patient to arise slowly    Elimination Interventions:  Toilet paper/wipes in reach

## 2021-10-05 NOTE — GROUP NOTE
QUETA  GROUP DOCUMENTATION INDIVIDUAL                                                                          Group Therapy Note    Date: 10/5/2021    Group Start Time: 1400  Group End Time: 1500  Group Topic: Education Group - Inpatient    [facility] [address] GROUP DOCUMENTATION GROUP    Group Therapy Note    Group members participated in psychoeducational group. SW provided psychoeducational interventions on [employer]. Techniques for rumination was discussed and group members participated in experiential learning.            Attendance: Did not attend      ROSIE Rhodes, Supervisee in Social Work

## 2021-10-05 NOTE — BH NOTES
PSYCHOSOCIAL ASSESSMENT  :Patient identifying info:   Noé Chi is a 31 y.o., female admitted 10/1/2021 12:14 PM     Presenting problem and precipitating factors: Pt is a voluntary admission. Pt is admitted for SI and reported anxiety. Pt reports a hx of suicidality with past attempts and hospitalizations. Pt reports recent life stressors including recent homelessness. Pt reports her   last August of a Myocardial Infarction which may be related to relapse. Pt reports her youngest child is now in custody of her in-laws. Pt reports she has been homeless since a recent breakup on Thursday. Mental status assessment: Pt is AO x 4, with SI, denies HI, denies AVH, cooperative. Pt has good eye contact. Mood is depressed. Affect is depressed. Reports anxiety and depression 7/10. Thinking is logical.     Strengths:  Verbal, cooperative    Collateral information: Dominic Luna: Pt arrived at [facility] in private vehicle. Pt reported that she did not feel safe on an outpatient basis and felt mental health was at risk. Pt reported SI without plan, but could not pledge safety outside an inpatient environment. Pt told staff, \"I am right there, and I need to prevent it from getting to the point I hurt myself. \"      Current psychiatric /substance abuse providers and contact info: [employer] [phone]    Previous psychiatric/substance abuse providers and response to treatment:  Pt reports a hx of multiple hospitalizations.  Hospitalization [facility] , [address] , [facility] 2014, [place] x 3 (, , ). Psychiatric: [place] (Pt states approximately 36 x's), [place], [place], [facility]    Family history of mental illness or substance abuse: Mother - unspecified mental illness    Substance abuse history: Pt reports a hx of prescription abuse up until her early 21's.   Pt reports she also abused alcohol as a teenager. Hx of methadone abuse. Pt reports she currently binges on meth. Social History     Tobacco Use    Smoking status: Current Every Day Smoker     Packs/day: 1.50     Types: Cigarettes    Smokeless tobacco: Never Used   Substance Use Topics    Alcohol use: Never       History of biomedical complications associated with substance abuse : None noted    Patient's current acceptance of treatment or motivation for change:  Accepts treatment. Desires to become stabilized and in her grief. Family constellation: Pt lived with her mother until age 8. Pt's father then took custody where she lived with him and her stepmother. She has 2 brothers, 1 step-sister, and 1 sister who . Pt's   last August.  She has 3 children and does not have custody of any. Is significant other involved? None      Describe support system: Pt reports her father is her support, but he lives in [facility]. Describe living arrangements and home environment:  Homeless    Health issues:   Hospital Problems  Never Reviewed        Codes Class Noted POA    Seizures, generalized convulsive ([facility] ICD-10-CM: R56.9  ICD-9-CM: 780.39  10/1/2021 Unknown        Depression with suicidal ideation ICD-10-CM: F32.A, R45.851  ICD-9-CM: 121, V62.84  10/1/2021 Unknown              Trauma history: Pt reports hx of trauma. Legal issues: DUI x 2, prescription fraud, contributing to the delinquency of a minor. History of  service: None    Financial status: Unemployed    Roman Catholic/cultural factors: None    Education/work history: Some college. Hx of .     Have you been licensed as a health care professional (current or ):   No  Leisure and recreation preferences:   Embroidery, reading, writing, sprinting, crafting  Describe coping skills: Hx of using drugs to cope    ROSIE Jackson, Supervisee in Social Work  10/4/2021    PSA PART II ADDITIONAL INFORMATION        Access To Fire [employer]: No    Substance Use: YES    Last Use: 10/1/2021    Type of Substance: Other Methamphetamine    Frequency of Use: Varies    Request to See : NO    If yes, notified : NO    Release of Information Signed: NO    Release of Information Signed For: None

## 2021-10-05 NOTE — GROUP NOTE
QUETA  GROUP DOCUMENTATION INDIVIDUAL                                                                          Group Therapy Note    Date: 10/5/2021    Group Start Time: 4726  Group End Time: 9881  Group Topic: Process Group - Inpatient    [facility] [address] [facility] GROUP DOCUMENTATION GROUP    Group Therapy Note    SW facilitated process group. Topic of discussion was, \"What does it mean to you to be happy? \"  Group members discussed their definition of happiness. SW read 10 common traits found in happy people and group members discussed. Attendance: Attended    Patient's Goal:  Attendance    Interventions/techniques: Informed and Validated    Follows Directions:  Followed directions    Interactions: Interacted appropriately    Mental Status: Calm and Congruent    Behavior/appearance: Attentive and Cooperative    Goals Achieved: Able to engage in interactions and Able to listen to others          ROSIE Stroud, Supervisee In Social Work

## 2021-10-05 NOTE — BH NOTES
Pt slept overnight, remained  remained sleeping as of this time. No violent, no slef harming behaviors, no seizure like activity noticed or reported.

## 2021-10-05 NOTE — PROGRESS NOTES
Psychiatric Progress Note    Patient: Julio Kumar MRN: [id number]  SSN: [id number]    YOB: 1985  Age: 31 y.o. Sex: female      Admit Date: 10/1/2021    LOS: 4 days     Subjective:     Julio Kumar  reports feeling mostly anxious, some depressed and indicating she was \"struggling\" with things . This trend has continued in her life having tried to work more than 7 times with a job. Denies SI/HI/AH/VH. No aggression or violence. Appropriately interactive and aware. Tolerating medications well. Eating well and sleeping well. Case reviewed with nursing staff and no significant issues or events reported in the last 24 hours. Staff has observed patient interacting on the unit and attending group.     Objective:     Vitals:    10/04/21 0604 10/04/21 1552 10/05/21 0606 10/05/21 1557   BP: (!) 100/59 96/72 93/60 102/64   Pulse: 85 (!) 101 80 (!) 106   Resp: 16 16 17 16   Temp: 97.6 °F (36.4 °C) 98.9 °F (37.2 °C) 97.9 °F (36.6 °C) 98.5 °F (36.9 °C)   SpO2: 93% 97% 95% 99%   Weight:       Height:            Mental Status Exam:   Sensorium  Oriented to time, place, and situation   Relations Connects with interviewer and engages appropriately   Eye Contact Appropriate   Appearance:  Appropriate for venue and season   Speech:  Normal rate, tone, volume and pattern   Thought Process: Linear, logical and goal-directed   Thought Content Free of delusions, hallucinations and does not appear to be responding to internal stimuli   Suicidal ideations None active and contracts for safety   Mood:  Depressed and anxious   Affect:  Flat, constricted and mood congruent   Memory    Intact   Concentration:  Intact   Insight:   Fair   Judgment:  Fair     No signs of tardive dyskinesia or extrapyramidal symptoms    MEDICATIONS:  Current Facility-Administered Medications   Medication Dose Route Frequency    dextroamphetamine-amphetamine (ADDERALL) tablet 20 mg  20 mg Oral BID    naproxen (NAPROSYN) tablet 375 mg  375 mg Oral Q8H PRN    OLANZapine (ZyPREXA) tablet 5 mg  5 mg Oral Q6H PRN    diphenhydrAMINE (BENADRYL) injection 50 mg  50 mg IntraMUSCular BID PRN    hydrOXYzine HCL (ATARAX) tablet 50 mg  50 mg Oral TID PRN    LORazepam (ATIVAN) injection 1 mg  1 mg IntraMUSCular Q4H PRN    traZODone (DESYREL) tablet 50 mg  50 mg Oral QHS PRN    acetaminophen (TYLENOL) tablet 650 mg  650 mg Oral Q4H PRN    magnesium hydroxide (MILK OF MAGNESIA) 400 mg/5 mL oral suspension 30 mL  30 mL Oral DAILY PRN    nicotine (NICODERM CQ) 21 mg/24 hr patch 1 Patch  1 Patch TransDERmal DAILY    levETIRAcetam (KEPPRA) tablet 250 mg  250 mg Oral BID    gabapentin (NEURONTIN) capsule 600 mg  600 mg Oral TID    busPIRone (BUSPAR) tablet 10 mg  10 mg Oral BID    QUEtiapine (SEROquel) tablet 400 mg  400 mg Oral QHS    mirtazapine (REMERON) tablet 30 mg  30 mg Oral QHS    clonazePAM (KlonoPIN) tablet 1 mg  1 mg Oral BID    benztropine (COGENTIN) tablet 0.5 mg  0.5 mg Oral BID    influenza vaccine 2021-22 (6 mos+)(PF) (FLUARIX/FLULAVAL/FLUZONE QUAD) injection 0.5 mL  1 Each IntraMUSCular PRIOR TO DISCHARGE            Assessment:     Principal Problem:    Severe episode of recurrent major depressive disorder ([facility] (10/5/2021)    Active Problems:    Seizures, generalized convulsive ([facility] (10/1/2021)      CATALINA (generalized anxiety disorder) (10/5/2021)      Suicidal ideation (10/5/2021)        Plan:     Continue current plan of care excepted as noted. Explore ADHD Dx a source for anxiety with Seroquel and Remeron at bedtime. Klonopin concern    Continue to participate in the milieu of activities and work towards discharge goals. Contracts for safety and has no immediate requests    Disposition planning with social work with the goal of a transition to an outpatient level of care.     Patient would benefit from ongoing care as they have not yet reached their discharge goals are psychotropic medication optimization.     Tentative discharge 3-5 days     Signed By: Bozena Rasmussen, PhD, PA-C, PsyCAQ    October 5, 2021

## 2021-10-06 PROCEDURE — 74011250637 HC RX REV CODE- 250/637: Performed by: PSYCHIATRY & NEUROLOGY

## 2021-10-06 PROCEDURE — 65220000003 HC RM SEMIPRIVATE PSYCH

## 2021-10-06 RX ADMIN — BUSPIRONE HYDROCHLORIDE 10 MG: 10 TABLET ORAL at 08:54

## 2021-10-06 RX ADMIN — QUETIAPINE FUMARATE 400 MG: 200 TABLET ORAL at 21:23

## 2021-10-06 RX ADMIN — BENZTROPINE MESYLATE 0.5 MG: 0.5 TABLET ORAL at 08:54

## 2021-10-06 RX ADMIN — GABAPENTIN 600 MG: 300 CAPSULE ORAL at 08:54

## 2021-10-06 RX ADMIN — CLONAZEPAM 1 MG: 1 TABLET ORAL at 08:54

## 2021-10-06 RX ADMIN — GABAPENTIN 600 MG: 300 CAPSULE ORAL at 17:31

## 2021-10-06 RX ADMIN — BUSPIRONE HYDROCHLORIDE 10 MG: 10 TABLET ORAL at 21:23

## 2021-10-06 RX ADMIN — MIRTAZAPINE 30 MG: 15 TABLET, FILM COATED ORAL at 21:23

## 2021-10-06 RX ADMIN — CLONAZEPAM 1 MG: 1 TABLET ORAL at 21:24

## 2021-10-06 RX ADMIN — LEVETIRACETAM 250 MG: 250 TABLET ORAL at 21:23

## 2021-10-06 RX ADMIN — HYDROXYZINE HYDROCHLORIDE 50 MG: 25 TABLET, FILM COATED ORAL at 17:31

## 2021-10-06 RX ADMIN — DEXTROAMPHETAMINE SACCHARATE, AMPHETAMINE ASPARTATE, DEXTROAMPHETAMINE SULFATE, AMPHETAMINE SULFATE TABLETS, 10 MG,CLL 20 MG: 2.5; 2.5; 2.5; 2.5 TABLET ORAL at 08:54

## 2021-10-06 RX ADMIN — BENZTROPINE MESYLATE 0.5 MG: 0.5 TABLET ORAL at 21:23

## 2021-10-06 RX ADMIN — GABAPENTIN 600 MG: 300 CAPSULE ORAL at 21:24

## 2021-10-06 RX ADMIN — DEXTROAMPHETAMINE SACCHARATE, AMPHETAMINE ASPARTATE, DEXTROAMPHETAMINE SULFATE, AMPHETAMINE SULFATE TABLETS, 10 MG,CLL 20 MG: 2.5; 2.5; 2.5; 2.5 TABLET ORAL at 14:37

## 2021-10-06 RX ADMIN — LEVETIRACETAM 250 MG: 250 TABLET ORAL at 08:54

## 2021-10-06 NOTE — PROGRESS NOTES
Followed up with patient to see if she wanted to talk, but she was in group. Patient inquired about the Torah.  informed her that reading the first five books of the Bible is the same, then she asked for an OT Bible. Rev.  Keara Alvarez [address], [address]

## 2021-10-06 NOTE — BH NOTES
Treatment Team Meeting held with patient, Suma Eldridge RN Clinical Coordinator and Juwan Olmstead RN. Patient Patient is alert and oriented x 3. Patient continues to complain of anxiety but rates her anxiety as a 10 highest when she was first admitted to unit. Now feels she is at a five. Discussed medications that she is on that has been verified by her pharamacy. Patient stated that she has had 7 jobs and cannot stay focused and usuaully ends walking off job. States the last time she worked was in 2015. Stats that she \"folds under pressure\". Patient works thought the [employer] [place]. And has seen a Psychiatrist by the name of Dr. Dilcia Nunez. [facility] will call Dr. Dilcia Nunez to see if he can get a more through history on patient and discuss her current medication         list. Patient states that she is homeless at this time. Patients states that she is on Adderal because of ADD. Patient states that she cannot sleep and cannot stay focused even to read. Patient stated that she had \"racing thoughts\" and become very anxious and fidgety when she does not have all of her medications. Stated the combination of these drugs work for her. Patient is on stimulants and benzo's which is what the pharmacy verified when I called to verify when patient was first admitted. Patient has been participating in groups and activities. She is up for meals and eats at least 75% of her meal and snacks. Will review medication changes with MICHELLE Goldstein for medication adjustments. Patient denies feeling SI/HI. Patient is complaint with her medication. Patient was willing to work with medication changes all but her Gabapentin. Patient has a history of seizures on remains on Keppra 250mg PO BID.

## 2021-10-06 NOTE — PROGRESS NOTES
Problem: Falls - Risk of  Goal: *Absence of Falls  Description: Document Starleen Freeze Fall Risk and appropriate interventions in the flowsheet. Outcome: Progressing Towards Goal  Note: Fall Risk Interventions:            Medication Interventions: Teach patient to arise slowly    Elimination Interventions:  Toilet paper/wipes in reach

## 2021-10-06 NOTE — BH NOTES
Pt received in activity room watching TV,,Pt socializing with her roommate, smiling, euthymic mood,  Denies SI/HI, Denies hallucinations.     seen by MICHELLE Valdivia beginning of shift, no new order made as of this time.     Pt attended. group rated depression as10 and anxiety as7, ate snack,Remained after group  In activity room watching Tv and socilaizing and talking to her roommate  , she  accepted her medication , without issue. after HS medication involved herself in reading while in bed,     No compliants made this shift as of this time. start sleeping by 2200.     No violent no self harming behavior noticed or reported.

## 2021-10-06 NOTE — BH NOTES
Pt. Alert and oriented x 4. Pt. States depression is a 5. Pt. States anxiety is 4. Pt. denies hallucinations. Denies SI/HI. Complies with assessment. I: Administer medications as ordered and needed, Encourage pt to attend and participate in groups, encourage pt to be up for all meals and snacks, consuming all each time, encourage pt to interact with peers in a positive manner. Q 15 minute safety checks continue. R: Complies with medications. does attend groups, does participate. Pt. is getting up for meals, consumes 50% of meals, snacks. Pt. does interact with peers. no safety concerns at this time. P:Patient will continue to take medications as ordered and participate in group sessions. Patient says she is still depressed but says \"it is normal and I just know how to act like everything is ok\".

## 2021-10-06 NOTE — PROGRESS NOTES
Psychiatric Progress Note    Patient: Roberto Carlos Valerio MRN: [id number]  SSN: [id number]    YOB: 1985  Age: 31 y.o. Sex: female      Admit Date: 10/1/2021    LOS: 5 days     Subjective:     Roberto Carlos Valerio  reports feeling mostly anxious, some depressed and indicating she was \"struggling\" with things . This trend has continued in her life having tried to work more than 7 times with a job. In controlled evironment 5/10 anxiety    Denies SI/HI/AH/VH. No aggression or violence. Appropriately interactive and aware. Tolerating medications well. Eating well and sleeping well. Case reviewed with nursing staff and no significant issues or events reported in the last 24 hours. Staff has observed patient interacting on the unit and attending group. Flo [place] since 2010 Adderall paradox role stimulant, never Vyvanse.  Dr Gong Nurse     Gabapentin, low-dose Adderall Seroquel and Klonopin although patient states she does not have a psychiatric past medical history    Objective:     Vitals:    10/04/21 1552 10/05/21 0606 10/05/21 1557 10/06/21 0553   BP: 96/72 93/60 102/64 92/61   Pulse: (!) 101 80 (!) 106 83   Resp: 16 17 16 16   Temp: 98.9 °F (37.2 °C) 97.9 °F (36.6 °C) 98.5 °F (36.9 °C) 98.5 °F (36.9 °C)   SpO2: 97% 95% 99%    Weight:       Height:            Mental Status Exam:   Sensorium  Oriented to time, place, and situation   Relations Connects with interviewer and engages appropriately   Eye Contact Appropriate   Appearance:  Appropriate for venue and season   Speech:  Normal rate, tone, volume and pattern   Thought Process: Linear, logical and goal-directed   Thought Content Free of delusions, hallucinations and does not appear to be responding to internal stimuli   Suicidal ideations None active and contracts for safety   Mood:  More anxious than depressed   Affect:  mood congruent   Memory    Intact   Concentration:  Intact   Insight:   Fair Judgment:  Fair     No signs of tardive dyskinesia or extrapyramidal symptoms    MEDICATIONS:  Current Facility-Administered Medications   Medication Dose Route Frequency    dextroamphetamine-amphetamine (ADDERALL) tablet 20 mg  20 mg Oral BID    naproxen (NAPROSYN) tablet 375 mg  375 mg Oral Q8H PRN    OLANZapine (ZyPREXA) tablet 5 mg  5 mg Oral Q6H PRN    diphenhydrAMINE (BENADRYL) injection 50 mg  50 mg IntraMUSCular BID PRN    hydrOXYzine HCL (ATARAX) tablet 50 mg  50 mg Oral TID PRN    LORazepam (ATIVAN) injection 1 mg  1 mg IntraMUSCular Q4H PRN    traZODone (DESYREL) tablet 50 mg  50 mg Oral QHS PRN    acetaminophen (TYLENOL) tablet 650 mg  650 mg Oral Q4H PRN    magnesium hydroxide (MILK OF MAGNESIA) 400 mg/5 mL oral suspension 30 mL  30 mL Oral DAILY PRN    nicotine (NICODERM CQ) 21 mg/24 hr patch 1 Patch  1 Patch TransDERmal DAILY    levETIRAcetam (KEPPRA) tablet 250 mg  250 mg Oral BID    gabapentin (NEURONTIN) capsule 600 mg  600 mg Oral TID    busPIRone (BUSPAR) tablet 10 mg  10 mg Oral BID    QUEtiapine (SEROquel) tablet 400 mg  400 mg Oral QHS    mirtazapine (REMERON) tablet 30 mg  30 mg Oral QHS    clonazePAM (KlonoPIN) tablet 1 mg  1 mg Oral BID    benztropine (COGENTIN) tablet 0.5 mg  0.5 mg Oral BID    influenza vaccine 2021-22 (6 mos+)(PF) (FLUARIX/FLULAVAL/FLUZONE QUAD) injection 0.5 mL  1 Each IntraMUSCular PRIOR TO DISCHARGE            Assessment:     Principal Problem:    Severe episode of recurrent major depressive disorder ([facility] (10/5/2021)    Active Problems:    Seizures, generalized convulsive ([facility] (10/1/2021)      CATALINA (generalized anxiety disorder) (10/5/2021)      Suicidal ideation (10/5/2021)        Plan:     Continue current plan of care excepted as noted. Explore ADHD Dx a source for anxiety with Seroquel and Remeron at bedtime.     Klonopin concern    SQ 25mg BID trial    Continue to participate in the milieu of activities and work towards discharge goals. Contracts for safety and has no immediate requests    Disposition planning with social work with the goal of a transition to an outpatient level of care. Patient would benefit from ongoing care as they have not yet reached their discharge goals are psychotropic medication optimization.     Tentative discharge 3-5 days, court is pending    Signed By: Beverly Molina, PhD, PA-C, PsyCAQ    October 6, 2021

## 2021-10-06 NOTE — PROGRESS NOTES
Patient actively participated in Spirituality Group utilizing word associations to engage the group on the [facility] unit. Rev.  Justin Stubbs, [address], Sanket Ballr

## 2021-10-06 NOTE — BH NOTES
Pt slept overnight, remained  remained sleeping as of this time. No violent, no self harming behaviors, no seizure like activity noticed or reported.

## 2021-10-07 PROCEDURE — 74011250637 HC RX REV CODE- 250/637: Performed by: PHYSICIAN ASSISTANT

## 2021-10-07 PROCEDURE — 74011250637 HC RX REV CODE- 250/637: Performed by: PSYCHIATRY & NEUROLOGY

## 2021-10-07 PROCEDURE — 65220000003 HC RM SEMIPRIVATE PSYCH

## 2021-10-07 RX ORDER — LORAZEPAM 1 MG/1
1 TABLET ORAL ONCE
Status: COMPLETED | OUTPATIENT
Start: 2021-10-07 | End: 2021-10-07

## 2021-10-07 RX ADMIN — LEVETIRACETAM 250 MG: 250 TABLET ORAL at 08:14

## 2021-10-07 RX ADMIN — GABAPENTIN 600 MG: 300 CAPSULE ORAL at 21:02

## 2021-10-07 RX ADMIN — NAPROXEN 375 MG: 375 TABLET ORAL at 09:32

## 2021-10-07 RX ADMIN — BUSPIRONE HYDROCHLORIDE 10 MG: 10 TABLET ORAL at 21:02

## 2021-10-07 RX ADMIN — LORAZEPAM 1 MG: 1 TABLET ORAL at 12:45

## 2021-10-07 RX ADMIN — BENZTROPINE MESYLATE 0.5 MG: 0.5 TABLET ORAL at 08:15

## 2021-10-07 RX ADMIN — DEXTROAMPHETAMINE SACCHARATE, AMPHETAMINE ASPARTATE, DEXTROAMPHETAMINE SULFATE, AMPHETAMINE SULFATE TABLETS, 10 MG,CLL 20 MG: 2.5; 2.5; 2.5; 2.5 TABLET ORAL at 13:13

## 2021-10-07 RX ADMIN — GABAPENTIN 600 MG: 300 CAPSULE ORAL at 16:16

## 2021-10-07 RX ADMIN — BUSPIRONE HYDROCHLORIDE 10 MG: 10 TABLET ORAL at 08:16

## 2021-10-07 RX ADMIN — HYDROXYZINE HYDROCHLORIDE 50 MG: 25 TABLET, FILM COATED ORAL at 09:32

## 2021-10-07 RX ADMIN — CLONAZEPAM 1 MG: 1 TABLET ORAL at 08:15

## 2021-10-07 RX ADMIN — LEVETIRACETAM 250 MG: 250 TABLET ORAL at 21:02

## 2021-10-07 RX ADMIN — BENZTROPINE MESYLATE 0.5 MG: 0.5 TABLET ORAL at 21:02

## 2021-10-07 RX ADMIN — CLONAZEPAM 1 MG: 1 TABLET ORAL at 21:02

## 2021-10-07 RX ADMIN — GABAPENTIN 600 MG: 300 CAPSULE ORAL at 08:15

## 2021-10-07 RX ADMIN — DEXTROAMPHETAMINE SACCHARATE, AMPHETAMINE ASPARTATE, DEXTROAMPHETAMINE SULFATE, AMPHETAMINE SULFATE TABLETS, 10 MG,CLL 20 MG: 2.5; 2.5; 2.5; 2.5 TABLET ORAL at 08:14

## 2021-10-07 RX ADMIN — MIRTAZAPINE 30 MG: 15 TABLET, FILM COATED ORAL at 21:02

## 2021-10-07 RX ADMIN — QUETIAPINE FUMARATE 400 MG: 200 TABLET ORAL at 21:02

## 2021-10-07 NOTE — BH NOTES
B: Pt is pleasant and cooperative with staff and peers. Attending and participating in  Groups and working on coping skills. States she is doing well with no suicidal and homicidal thoughts. States depression and anxiety is better. Compliant with meds, eating and sleeping well. Safe on unit. I: Maintain a safe environment for pt, safety cks q 15 mins and prn. Give meds and encourage groups. R: Pt is friendly with staff and interacts well with peers. States she is feeling better and has no suicidal or homicidal thoughts. Safe on unit will continue to monitor. P: Continue to monitor, give meds as ordered, encourage groups.

## 2021-10-07 NOTE — BH NOTES
Behavioral Health Treatment Team Note      Patient goal(s) for today: Contact father  Treatment team focus/goals: Engage in treatment, monitor medications, work for safe discharge     Progress note: Pt is a voluntary admission. Pt is AO x 4, with SI, denies HI, denies AVH, cooperative. Pt has good eye contact. Mood is more stable.  Affect is restricted.  Reports anxiety and depression 4/10.  Thinking is logical. VIVIAN spoke to pt's , Eduardo Fang, who states that there is not immediate crisis stepdown availability in their area. Pt reports she may consider a long-term rehab program if available. She reports she has been turned down before due to medications. SW discussed some options for discharge with her when she felt ready. Pt is participating in groups and interacting well with other pts.   Will continue to monitor medications and engage in treatment.      LOS:  6                      Expected LOS: 7     Insurance info/prescription coverage:  [address]  Date of last family contact:  None  Family requesting physician contact today:  no  Discharge plan:  Crisis stabilization  Guns in the home:  no   Outpatient provider(s):  [facility]

## 2021-10-07 NOTE — BH NOTES
Patient has been asking staff that if she sleep wake her up to Adderall. Patient seen by MICHELLE Larson this afternoon. No new orders. Patient did received a one time dose of Ativan 1mg PO this am. Patient is minimizing the amount of medications that she takes when she is at home. The issues of medications has a lot to do with why patient is having issues with her .  talked with patient's  via phone which confirmed that patient is not admitting to the amount of medicatipateons that she is taking.  states that patient becomes highly agitated when taking the medications. See admission medication list. Will continue to monitor every 15 minutes for safety.

## 2021-10-07 NOTE — PROGRESS NOTES
Patient actively participated in Spirituality Group about utilizing music as a coping skill and being able to identify the emotions associated with the songs we're listening to and being more aware on how to manage our emotions, attitudes, and spirit on the [facility] unit. Rev.  Dayana Keara Gentile 02, [address]

## 2021-10-07 NOTE — GROUP NOTE
QUETA  GROUP DOCUMENTATION INDIVIDUAL                                                                          Group Therapy Note    Date: 10/7/2021    Group Start Time: 1400  Group End Time: 6970  Group Topic: Education Group - Inpatient    [facility] [address] GROUP DOCUMENTATION GROUP    Group Therapy Note    Group members participated in psychoeducational group. Psychoeducation on emotions and mindfulness were given by VIVIAN.  SW led in experiential exercise, \"Dropping Rhodell. \"          Attendance: Did not attend      Link ROSIE Gan, Supervisee in Social Work

## 2021-10-07 NOTE — BH NOTES
B: Pateint is alert and oriented x 3. Patient up and showered this am and changed into hospital approved scrubs. Patient stated she slept well last night. Up for breakfast with peers. Eating 75% and greater for all meals and snacks. Taking medications as ordered. Patient denies SI/HI ideations. Still feels anxious but better than she was on admission. Patients wants to learn ways to control her anxiety and for medication regimen. I. Will provide medications as ordered and continue every 15 minute safety checks. R: Patient is complaint with all medications. Attends groups and activities as scheduled. Patient interacts well with staff and peers. P: Continue with current plan of care. Goal to help patient find ways to help decrease her anxiety to prevent rehospitalization. Review medications with patient to be sure she remains on regimen as prescribed.

## 2021-10-07 NOTE — PROGRESS NOTES
Pt is progressing toward her goals. Denies suicidal and homicidal thoughts, says her depression and anxiety is better. Attending and participating in groups, compliant with meds. Safe on unit.

## 2021-10-08 PROCEDURE — 74011250637 HC RX REV CODE- 250/637: Performed by: HOSPITALIST

## 2021-10-08 PROCEDURE — 65220000003 HC RM SEMIPRIVATE PSYCH

## 2021-10-08 PROCEDURE — 74011250637 HC RX REV CODE- 250/637: Performed by: PHYSICIAN ASSISTANT

## 2021-10-08 PROCEDURE — 74011250637 HC RX REV CODE- 250/637: Performed by: PSYCHIATRY & NEUROLOGY

## 2021-10-08 RX ORDER — MAG HYDROX/ALUMINUM HYD/SIMETH 200-200-20
30 SUSPENSION, ORAL (FINAL DOSE FORM) ORAL
Status: DISCONTINUED | OUTPATIENT
Start: 2021-10-08 | End: 2021-10-11 | Stop reason: HOSPADM

## 2021-10-08 RX ORDER — QUETIAPINE FUMARATE 25 MG/1
25 TABLET, FILM COATED ORAL DAILY
Status: DISCONTINUED | OUTPATIENT
Start: 2021-10-08 | End: 2021-10-11 | Stop reason: HOSPADM

## 2021-10-08 RX ORDER — GABAPENTIN 300 MG/1
600 CAPSULE ORAL 3 TIMES DAILY
Status: DISCONTINUED | OUTPATIENT
Start: 2021-10-08 | End: 2021-10-11 | Stop reason: HOSPADM

## 2021-10-08 RX ADMIN — LEVETIRACETAM 250 MG: 250 TABLET ORAL at 21:00

## 2021-10-08 RX ADMIN — CLONAZEPAM 1 MG: 1 TABLET ORAL at 08:08

## 2021-10-08 RX ADMIN — GABAPENTIN 600 MG: 300 CAPSULE ORAL at 08:07

## 2021-10-08 RX ADMIN — MAGNESIUM HYDROXIDE/ALUMINUM HYDROXICE/SIMETHICONE 30 ML: 120; 1200; 1200 SUSPENSION ORAL at 12:52

## 2021-10-08 RX ADMIN — BUSPIRONE HYDROCHLORIDE 10 MG: 10 TABLET ORAL at 08:08

## 2021-10-08 RX ADMIN — CLONAZEPAM 1 MG: 1 TABLET ORAL at 21:00

## 2021-10-08 RX ADMIN — LEVETIRACETAM 250 MG: 250 TABLET ORAL at 08:07

## 2021-10-08 RX ADMIN — QUETIAPINE FUMARATE 400 MG: 200 TABLET ORAL at 21:01

## 2021-10-08 RX ADMIN — NAPROXEN 375 MG: 375 TABLET ORAL at 17:08

## 2021-10-08 RX ADMIN — GABAPENTIN 600 MG: 300 CAPSULE ORAL at 21:01

## 2021-10-08 RX ADMIN — DEXTROAMPHETAMINE SACCHARATE, AMPHETAMINE ASPARTATE, DEXTROAMPHETAMINE SULFATE, AMPHETAMINE SULFATE TABLETS, 10 MG,CLL 20 MG: 2.5; 2.5; 2.5; 2.5 TABLET ORAL at 13:22

## 2021-10-08 RX ADMIN — BUSPIRONE HYDROCHLORIDE 10 MG: 10 TABLET ORAL at 21:00

## 2021-10-08 RX ADMIN — QUETIAPINE FUMARATE 25 MG: 25 TABLET ORAL at 08:13

## 2021-10-08 RX ADMIN — DEXTROAMPHETAMINE SACCHARATE, AMPHETAMINE ASPARTATE, DEXTROAMPHETAMINE SULFATE, AMPHETAMINE SULFATE TABLETS, 10 MG,CLL 20 MG: 2.5; 2.5; 2.5; 2.5 TABLET ORAL at 08:07

## 2021-10-08 RX ADMIN — HYDROXYZINE HYDROCHLORIDE 50 MG: 25 TABLET, FILM COATED ORAL at 17:08

## 2021-10-08 NOTE — GROUP NOTE
QUETA  GROUP DOCUMENTATION INDIVIDUAL                                                                          Group Therapy Note    Date: 10/8/2021    Group Start Time: 7129  Group End Time: 3818  Group Topic: Process Group - Inpatient    [facility]    Group Therapy Note    Group members participated in morning process group. Group members discussed self-compassion. Self-compassion was defined within group, discussed, and group member were educated on taking a self-compassion break. Psychoeducational handout was provided on Self-Compassion Breaks. Attendance: Attended    Patient's Goal:  Attendance    Interventions/techniques: Informed, Validated and Supported    Follows Directions:  Followed directions    Interactions: Interacted appropriately    Mental Status: Calm and Congruent    Behavior/appearance: Attentive and Cooperative    Goals Achieved: Able to engage in interactions and Able to listen to others    ROSIE Arias, Supervisee in Social Work

## 2021-10-08 NOTE — BH NOTES
Behavioral Health Treatment Team Note      Patient goal(s) for today: Contact father  Treatment team focus/goals: Engage in treatment, monitor medications, work for safe discharge     Progress note: Pt is a voluntary admission.  Pt is AO x 4, denies SI/HI, denies AVH, cooperative. Pt has good eye contact. Mood is more stable.  Affect is restricted.  Reports anxiety and depression 4/10.  Thinking is logical. Pt has been in contact with  about her needs. Pt reports she may consider a long-term rehab program if available.   SW and pt discussed available options for treatment and lack of known long-term facilities that will take pt on her specific medications.  Pt is participating in groups and interacting well with other pts.  Will continue to monitor medications and engage in treatment.      UTJ:  7                      QFRSMAUA LOS: 10     Insurance info/prescription [address]  Date of last family contact:  None  Family requesting physician contact today:  no  Discharge plan:  Crisis stabilization  Guns in the home:  no   Outpatient provider(s):  [facility] [employer]

## 2021-10-08 NOTE — BH NOTES
B: Pt alert and oriented x4; pt expresed that that her therapists was looking for a long term treatment facility for her when d/c. Pt states dep 9/10, anxiety 7/10, denies SI/HI, and A/V/H. Pt cooperative with assessment. Pt monitored [id number]     I: Encourage pt to attend and participate in all groups and wrap up; Administer medication as ordered and  needed. Encourage pt be up for all meals and snacks. Encourage pt to interact with staff/peers in a positive manner; Continue q15 min safety checks     R: Pt completed and participated, consumed snack. Pt interacts well with all her peers and staff. Pt medication administer. Denies any pain Pt monitored [id number]     P: Pt will develop and utilize positive coping skills. Pt monitored [id number]    0500: pt up only once through the night.  Slept well with no issues

## 2021-10-08 NOTE — BH NOTES
Pt. Alert and oriented x 4. Pt. States depression is a 0. Pt. States anxiety is 0. Pt. Denies hallucinations. Denies SI/HI. Cooperative with assessment. Pt. Asking for movie list already this am, when informed it will have to wait until after groups in down time later today, pt. Spoke with a short, loud \"okay, okay\". Upon writer asking if something was bothering her, pt. States, in a calm voice,\" no I'm good\" smiling. I: Administer medications as ordered and needed, Encourage pt to attend and participate in groups, encourage pt to be up for all meals and snacks, consuming all each time, encourage pt to interact with peers in a positive manner. Q 15 minute safety checks continue. R: Compliant with medications. does attend groups, does participate. Pt. is getting up for meals, consumes all of meals, snacks. Pt. does interact with peers. no safety concerns at this time. P: Pt. Will continue to utilize positive coping skills.

## 2021-10-08 NOTE — PROGRESS NOTES
Psychiatric Progress Note    Patient: Jace Stephenson MRN: [id number]  SSN: [id number]    YOB: 1985  Age: 31 y.o. Sex: female      Admit Date: 10/1/2021    LOS: 7 days     Subjective:     Jace Stephenson  reports feeling better although she required Ativan due to her anxiety because Vistaril was not working. Denies SI/HI/AH/VH. No aggression or violence. Appropriately interactive and aware. She reports feeling a little better than when she arrived but anxiety is still an issue and she is fearful about being able to manage it when she leaves the facility as she recognizes this is a controlled environment. Case reviewed with nursing staff and no significant issues or events reported in the last 24 hours. Staff has observed patient interacting on the unit and attending group.       Objective:     Vitals:    10/07/21 0548 10/07/21 0838 10/07/21 1600 10/08/21 0557   BP: (!) 92/56 (!) 92/56 93/62 97/64   Pulse: 77 77 87 79   Resp: 17 17 20 16   Temp: 98.1 °F (36.7 °C) 98.1 °F (36.7 °C) 99 °F (37.2 °C) 97.7 °F (36.5 °C)   SpO2: 94%  96% 96%   Weight:       Height:            Mental Status Exam:   Sensorium  Oriented to time, place, and situation   Relations Connects with interviewer and engages appropriately   Eye Contact Appropriate   Appearance:  Appropriate for venue and season   Speech:  Normal rate, tone, volume and pattern   Thought Process: Linear, logical and goal-directed   Thought Content Free of delusions, hallucinations and does not appear to be responding to internal stimuli   Suicidal ideations None active and contracts for safety   Mood:  Depressed but calm   Affect:  mood congruent   Memory    Intact   Concentration:  Intact   Insight:   Fair   Judgment:  Fair     No signs of tardive dyskinesia or extrapyramidal symptoms    MEDICATIONS:  Current Facility-Administered Medications   Medication Dose Route Frequency    dextroamphetamine-amphetamine (ADDERALL) tablet 20 mg  20 mg Oral BID    naproxen (NAPROSYN) tablet 375 mg  375 mg Oral Q8H PRN    OLANZapine (ZyPREXA) tablet 5 mg  5 mg Oral Q6H PRN    diphenhydrAMINE (BENADRYL) injection 50 mg  50 mg IntraMUSCular BID PRN    hydrOXYzine HCL (ATARAX) tablet 50 mg  50 mg Oral TID PRN    LORazepam (ATIVAN) injection 1 mg  1 mg IntraMUSCular Q4H PRN    traZODone (DESYREL) tablet 50 mg  50 mg Oral QHS PRN    acetaminophen (TYLENOL) tablet 650 mg  650 mg Oral Q4H PRN    magnesium hydroxide (MILK OF MAGNESIA) 400 mg/5 mL oral suspension 30 mL  30 mL Oral DAILY PRN    nicotine (NICODERM CQ) 21 mg/24 hr patch 1 Patch  1 Patch TransDERmal DAILY    levETIRAcetam (KEPPRA) tablet 250 mg  250 mg Oral BID    gabapentin (NEURONTIN) capsule 600 mg  600 mg Oral TID    busPIRone (BUSPAR) tablet 10 mg  10 mg Oral BID    QUEtiapine (SEROquel) tablet 400 mg  400 mg Oral QHS    clonazePAM (KlonoPIN) tablet 1 mg  1 mg Oral BID    influenza vaccine 2021-22 (6 mos+)(PF) (FLUARIX/FLULAVAL/FLUZONE QUAD) injection 0.5 mL  1 Each IntraMUSCular PRIOR TO DISCHARGE            Assessment:     Principal Problem:    Severe episode of recurrent major depressive disorder ([facility] [address] (10/5/2021)    Active Problems:    Seizures, generalized convulsive ([facility] [address] (10/1/2021)      CATALINA (generalized anxiety disorder) (10/5/2021)      Suicidal ideation (10/5/2021)        Plan:     Continue current plan of care excepted as noted. Explore ADHD Dx a source for anxiety with Seroquel and Remeron at bedtime. Klonopin concern but doing fair without it     Seroquel 25mg every morning    Continue to participate in the milieu of activities and work towards discharge goals. Contracts for safety and has no immediate requests    Disposition planning with social work with the goal of a transition to an outpatient level of care.     Patient would benefit from ongoing care as they have not yet reached their discharge goals are psychotropic medication optimization.     Tentative discharge  TBD    Signed By: Herminia Olson, PhD, PA-C, PsyCAQ    October 8, 2021

## 2021-10-08 NOTE — BH NOTES
Pt. Up in day room throughout the day, interacting with peers, meals, groups. Pt. Requesting several things from Nursing throughout the day. Pt. Has been up and down , not sitting much in day room for long periods. Hyperverbal at times. Easily redirects. No safety concerns noted. q 15 minute safety checks continue.

## 2021-10-09 PROCEDURE — 65220000003 HC RM SEMIPRIVATE PSYCH

## 2021-10-09 PROCEDURE — 74011250637 HC RX REV CODE- 250/637: Performed by: HOSPITALIST

## 2021-10-09 PROCEDURE — 74011250637 HC RX REV CODE- 250/637: Performed by: PSYCHIATRY & NEUROLOGY

## 2021-10-09 PROCEDURE — 74011250637 HC RX REV CODE- 250/637: Performed by: PHYSICIAN ASSISTANT

## 2021-10-09 RX ADMIN — GABAPENTIN 600 MG: 300 CAPSULE ORAL at 08:39

## 2021-10-09 RX ADMIN — GABAPENTIN 600 MG: 300 CAPSULE ORAL at 13:03

## 2021-10-09 RX ADMIN — CLONAZEPAM 1 MG: 1 TABLET ORAL at 08:39

## 2021-10-09 RX ADMIN — HYDROXYZINE HYDROCHLORIDE 50 MG: 25 TABLET, FILM COATED ORAL at 15:10

## 2021-10-09 RX ADMIN — MAGNESIUM HYDROXIDE/ALUMINUM HYDROXICE/SIMETHICONE 30 ML: 120; 1200; 1200 SUSPENSION ORAL at 12:39

## 2021-10-09 RX ADMIN — BUSPIRONE HYDROCHLORIDE 10 MG: 10 TABLET ORAL at 21:11

## 2021-10-09 RX ADMIN — ACETAMINOPHEN 650 MG: 325 TABLET ORAL at 11:27

## 2021-10-09 RX ADMIN — QUETIAPINE FUMARATE 25 MG: 25 TABLET ORAL at 08:39

## 2021-10-09 RX ADMIN — NAPROXEN 375 MG: 375 TABLET ORAL at 16:52

## 2021-10-09 RX ADMIN — NAPROXEN 375 MG: 375 TABLET ORAL at 08:39

## 2021-10-09 RX ADMIN — LEVETIRACETAM 250 MG: 250 TABLET ORAL at 21:11

## 2021-10-09 RX ADMIN — CLONAZEPAM 1 MG: 1 TABLET ORAL at 21:11

## 2021-10-09 RX ADMIN — ACETAMINOPHEN 650 MG: 325 TABLET ORAL at 15:06

## 2021-10-09 RX ADMIN — BUSPIRONE HYDROCHLORIDE 10 MG: 10 TABLET ORAL at 08:40

## 2021-10-09 RX ADMIN — DEXTROAMPHETAMINE SACCHARATE, AMPHETAMINE ASPARTATE, DEXTROAMPHETAMINE SULFATE, AMPHETAMINE SULFATE TABLETS, 10 MG,CLL 20 MG: 2.5; 2.5; 2.5; 2.5 TABLET ORAL at 08:39

## 2021-10-09 RX ADMIN — QUETIAPINE FUMARATE 400 MG: 200 TABLET ORAL at 21:10

## 2021-10-09 RX ADMIN — GABAPENTIN 600 MG: 300 CAPSULE ORAL at 21:11

## 2021-10-09 RX ADMIN — DEXTROAMPHETAMINE SACCHARATE, AMPHETAMINE ASPARTATE, DEXTROAMPHETAMINE SULFATE, AMPHETAMINE SULFATE TABLETS, 10 MG,CLL 20 MG: 2.5; 2.5; 2.5; 2.5 TABLET ORAL at 13:04

## 2021-10-09 RX ADMIN — LEVETIRACETAM 250 MG: 250 TABLET ORAL at 08:39

## 2021-10-09 NOTE — PROGRESS NOTES
Problem: Depressed Mood (Adult/Pediatric)  Goal: *STG: Participates in treatment plan  Outcome: Progressing Towards Goal  Goal: *STG: Attends activities and groups  Outcome: Progressing Towards Goal  Goal: *STG: Remains safe in hospital  Outcome: Progressing Towards Goal

## 2021-10-09 NOTE — BH NOTES
Pt. Alert and oriented x 4. Pt. States depression is a 0. Pt. States anxiety is 5. Pt. denies hallucinations. Denies SI/HI. Cooperative with assessment. Pt. At nurses station approx. Every 10-15 minutes requesting different things. Pleasant, most of the time. I: Administer medications as ordered and needed, Encourage pt to attend and participate in groups, encourage pt to be up for all meals and snacks, consuming all each time, encourage pt to interact with peers in a positive manner. Q 15 minute safety checks continue. R: Compliant with medications. does attend groups, does participate. Pt. is getting up for meals, consumes all of meals, snacks. Pt. does interact with peers. no safety concerns at this time. P: Pt. Will develop and utilize positive coping skills.

## 2021-10-09 NOTE — GROUP NOTE
IP  GROUP DOCUMENTATION INDIVIDUAL                                                                          Group Therapy Note    Date: 10/9/2021    Group Start Time: 0800  Group End Time: 0845  Group Topic: Nursing     [address], RN    IP [address] GROUP DOCUMENTATION GROUP    Group Therapy Note    Attendees:          Attendance: Attended    Patient's Goal:  \" I am ready to go home\". Interventions/techniques: Informed    Follows Directions: Followed directions    Interactions: Interacted appropriately    Mental Status: Calm    Behavior/appearance: Cooperative    Goals Achieved: Identified feelings      Additional Notes:  Sitting in day room watching movies.     Samir Spears RN

## 2021-10-09 NOTE — BH NOTES
Patient is calm and cooperative and interacting with other patients and staff. Currently in dining room watching movie with other patients.

## 2021-10-09 NOTE — BH NOTES
B: Pt alert and oriented x4; pt rec'd lying down due to having cramps and stated the naproxen wasn't helping. Pt states dep 7/10, anxiety 7/10, pain 6/10 but denies SI/HI, and A/V/H. pt stated all of this after receiving Atarax 50mg and Naproxen. Pt expressed she needed something else for pain. Asad Late in to assist pt. Pt cooperative with assessment but not happy because she wanted Ultram. Pt monitored [id number]     I: Encourage pt to attend and participate in all groups and wrap up; Administer medication as ordered and  needed. Encourage pt be up for all meals and snacks.  Encourage pt to interact with staff/peers in a positive manner; Continue q15 min safety checks     R: Pt completed and partial participated in wrap up, consumed snack and left group. Pt interacts well with all her peers and staff at times. Pt medication administered. Pt monitored [id number]     P: Pt will develop and utilize positive coping skills. Pt monitored [id number]    0530: pt only up once during the night

## 2021-10-10 PROCEDURE — 74011250637 HC RX REV CODE- 250/637: Performed by: PHYSICIAN ASSISTANT

## 2021-10-10 PROCEDURE — 74011250637 HC RX REV CODE- 250/637: Performed by: PSYCHIATRY & NEUROLOGY

## 2021-10-10 PROCEDURE — 74011250637 HC RX REV CODE- 250/637: Performed by: HOSPITALIST

## 2021-10-10 PROCEDURE — 65220000003 HC RM SEMIPRIVATE PSYCH

## 2021-10-10 RX ORDER — TRAMADOL HYDROCHLORIDE 50 MG/1
50 TABLET ORAL
Status: COMPLETED | OUTPATIENT
Start: 2021-10-10 | End: 2021-10-10

## 2021-10-10 RX ADMIN — BUSPIRONE HYDROCHLORIDE 10 MG: 10 TABLET ORAL at 08:31

## 2021-10-10 RX ADMIN — LEVETIRACETAM 250 MG: 250 TABLET ORAL at 08:31

## 2021-10-10 RX ADMIN — QUETIAPINE FUMARATE 400 MG: 200 TABLET ORAL at 21:09

## 2021-10-10 RX ADMIN — CLONAZEPAM 1 MG: 1 TABLET ORAL at 21:09

## 2021-10-10 RX ADMIN — GABAPENTIN 600 MG: 300 CAPSULE ORAL at 21:09

## 2021-10-10 RX ADMIN — CLONAZEPAM 1 MG: 1 TABLET ORAL at 08:31

## 2021-10-10 RX ADMIN — QUETIAPINE FUMARATE 25 MG: 25 TABLET ORAL at 08:31

## 2021-10-10 RX ADMIN — DEXTROAMPHETAMINE SACCHARATE, AMPHETAMINE ASPARTATE, DEXTROAMPHETAMINE SULFATE, AMPHETAMINE SULFATE TABLETS, 10 MG,CLL 20 MG: 2.5; 2.5; 2.5; 2.5 TABLET ORAL at 08:31

## 2021-10-10 RX ADMIN — LEVETIRACETAM 250 MG: 250 TABLET ORAL at 21:09

## 2021-10-10 RX ADMIN — MAGNESIUM HYDROXIDE/ALUMINUM HYDROXICE/SIMETHICONE 30 ML: 120; 1200; 1200 SUSPENSION ORAL at 12:59

## 2021-10-10 RX ADMIN — DEXTROAMPHETAMINE SACCHARATE, AMPHETAMINE ASPARTATE, DEXTROAMPHETAMINE SULFATE, AMPHETAMINE SULFATE TABLETS, 10 MG,CLL 20 MG: 2.5; 2.5; 2.5; 2.5 TABLET ORAL at 12:57

## 2021-10-10 RX ADMIN — TRAMADOL HYDROCHLORIDE 50 MG: 50 TABLET, FILM COATED ORAL at 13:41

## 2021-10-10 RX ADMIN — BUSPIRONE HYDROCHLORIDE 10 MG: 10 TABLET ORAL at 21:09

## 2021-10-10 RX ADMIN — MAGNESIUM HYDROXIDE/ALUMINUM HYDROXICE/SIMETHICONE 30 ML: 120; 1200; 1200 SUSPENSION ORAL at 08:32

## 2021-10-10 RX ADMIN — GABAPENTIN 600 MG: 300 CAPSULE ORAL at 08:32

## 2021-10-10 RX ADMIN — NAPROXEN 375 MG: 375 TABLET ORAL at 08:31

## 2021-10-10 RX ADMIN — GABAPENTIN 600 MG: 300 CAPSULE ORAL at 12:57

## 2021-10-10 NOTE — BH NOTES
Pt received in activity room watching TV,,Pt socializing withothers, smiling, euthymic mood,  Denies SI/HI, Denies hallucinations. Rated depression as 6 and anxiety as 5, no complaints with assessment made.     Pt attended. group, ate snack,Remained after group  In activity room watching Tv and socilaizing others  , she  accepted her medication ,  after HS medication involved herself in reading while in bed,     No compliants made this shift as of this time. start sleeping by 2215     No siezure like activity, no  violent no self harming behavior noticed or reported.

## 2021-10-10 NOTE — GROUP NOTE
[facility] GROUP DOCUMENTATION INDIVIDUAL                                                                          Group Therapy Note    Date: 10/10/2021    Group Start Time: 0900  Group End Time: 0945  Group Topic: Nursing     [address] , RN    IP [facility] GROUP DOCUMENTATION GROUP    Group Therapy Note    Attendees:          Attendance: Attended    Patient's Goal:  \"to feel safe when I am discharged\"    Interventions/techniques: Informed    Follows Directions: Followed directions    Interactions: Interacted appropriately    Mental Status: Calm    Behavior/appearance: Cooperative    Goals Achieved:  Identified feelings      Additional Notes:      Josh Bolaños RN

## 2021-10-10 NOTE — PROGRESS NOTES
Problem: Falls - Risk of  Goal: *Absence of Falls  Description: Document Clark Sol Fall Risk and appropriate interventions in the flowsheet. Outcome: Progressing Towards Goal  Note: Fall Risk Interventions:            Medication Interventions: Teach patient to arise slowly    Elimination Interventions:  Toilet paper/wipes in reach

## 2021-10-10 NOTE — BH NOTES
Pt. Alert and oriented x 4. Pt. States depression is a 0. Pt. States anxiety is 3. Pt. denies hallucinations. Denies SI/HI. Cooperative with assessment. I: Administer medications as ordered and needed, Encourage pt to attend and participate in groups, encourage pt to be up for all meals and snacks, consuming all each time, encourage pt to interact with peers in a positive manner. Q 15 minute safety checks continue. R: Compliant with medications. does attend groups, does participate. Pt. is getting up for meals, consumes all of meals, snacks. Pt. does interact with peers. no safety concerns at this time. P: Pt.  Will develop and utilize positive coping skills.

## 2021-10-10 NOTE — PROGRESS NOTES
Problem: Falls - Risk of  Goal: *Absence of Falls  Description: Document Abundio Hernandez Fall Risk and appropriate interventions in the flowsheet. Outcome: Progressing Towards Goal  Note: Fall Risk Interventions:            Medication Interventions: Teach patient to arise slowly    Elimination Interventions:  Toilet paper/wipes in reach

## 2021-10-11 VITALS
BODY MASS INDEX: 26.99 KG/M2 | DIASTOLIC BLOOD PRESSURE: 59 MMHG | OXYGEN SATURATION: 97 % | TEMPERATURE: 98.2 F | SYSTOLIC BLOOD PRESSURE: 93 MMHG | HEART RATE: 81 BPM | RESPIRATION RATE: 16 BRPM | HEIGHT: 65 IN | WEIGHT: 162 LBS

## 2021-10-11 PROBLEM — R45.851 SUICIDAL IDEATION: Status: RESOLVED | Noted: 2021-10-05 | Resolved: 2021-10-11

## 2021-10-11 PROCEDURE — 90686 IIV4 VACC NO PRSV 0.5 ML IM: CPT | Performed by: PSYCHIATRY & NEUROLOGY

## 2021-10-11 PROCEDURE — 74011250637 HC RX REV CODE- 250/637: Performed by: HOSPITALIST

## 2021-10-11 PROCEDURE — 74011250637 HC RX REV CODE- 250/637: Performed by: PHYSICIAN ASSISTANT

## 2021-10-11 PROCEDURE — 90471 IMMUNIZATION ADMIN: CPT

## 2021-10-11 PROCEDURE — 74011250637 HC RX REV CODE- 250/637: Performed by: PSYCHIATRY & NEUROLOGY

## 2021-10-11 PROCEDURE — 74011250636 HC RX REV CODE- 250/636: Performed by: PSYCHIATRY & NEUROLOGY

## 2021-10-11 RX ORDER — BUPROPION HYDROCHLORIDE 150 MG/1
150 TABLET, EXTENDED RELEASE ORAL DAILY
Status: DISCONTINUED | OUTPATIENT
Start: 2021-10-11 | End: 2021-10-11 | Stop reason: HOSPADM

## 2021-10-11 RX ORDER — BUSPIRONE HYDROCHLORIDE 10 MG/1
10 TABLET ORAL 2 TIMES DAILY
Qty: 60 TABLET | Refills: 0 | Status: SHIPPED | OUTPATIENT
Start: 2021-10-11 | End: 2021-10-12 | Stop reason: SDUPTHER

## 2021-10-11 RX ORDER — LEVETIRACETAM 250 MG/1
250 TABLET ORAL 2 TIMES DAILY
Qty: 60 TABLET | Refills: 0 | Status: SHIPPED | OUTPATIENT
Start: 2021-10-11 | End: 2021-10-12 | Stop reason: SDUPTHER

## 2021-10-11 RX ORDER — BUPROPION HYDROCHLORIDE 150 MG/1
150 TABLET, EXTENDED RELEASE ORAL DAILY
Qty: 30 TABLET | Refills: 0 | Status: SHIPPED | OUTPATIENT
Start: 2021-10-12 | End: 2021-10-12 | Stop reason: SDUPTHER

## 2021-10-11 RX ORDER — QUETIAPINE FUMARATE 25 MG/1
25 TABLET, FILM COATED ORAL DAILY
Qty: 30 TABLET | Refills: 0 | Status: SHIPPED | OUTPATIENT
Start: 2021-10-12 | End: 2021-10-12 | Stop reason: SDUPTHER

## 2021-10-11 RX ORDER — QUETIAPINE FUMARATE 400 MG/1
400 TABLET, FILM COATED ORAL
Qty: 30 TABLET | Refills: 0 | Status: SHIPPED | OUTPATIENT
Start: 2021-10-11 | End: 2021-10-12 | Stop reason: SDUPTHER

## 2021-10-11 RX ORDER — CLONAZEPAM 1 MG/1
1 TABLET ORAL 2 TIMES DAILY
Qty: 30 TABLET | Refills: 0 | Status: SHIPPED | OUTPATIENT
Start: 2021-10-11 | End: 2021-10-12 | Stop reason: SDUPTHER

## 2021-10-11 RX ORDER — DEXTROAMPHETAMINE SACCHARATE, AMPHETAMINE ASPARTATE, DEXTROAMPHETAMINE SULFATE AND AMPHETAMINE SULFATE 5; 5; 5; 5 MG/1; MG/1; MG/1; MG/1
20 TABLET ORAL 2 TIMES DAILY
Qty: 60 TABLET | Refills: 0 | Status: SHIPPED | OUTPATIENT
Start: 2021-10-11 | End: 2021-10-12 | Stop reason: SDUPTHER

## 2021-10-11 RX ORDER — GABAPENTIN 300 MG/1
600 CAPSULE ORAL 3 TIMES DAILY
Qty: 90 CAPSULE | Refills: 0 | Status: SHIPPED | OUTPATIENT
Start: 2021-10-11 | End: 2021-10-12 | Stop reason: SDUPTHER

## 2021-10-11 RX ORDER — BUPROPION HYDROCHLORIDE 150 MG/1
150 TABLET, EXTENDED RELEASE ORAL DAILY
Status: DISCONTINUED | OUTPATIENT
Start: 2021-10-12 | End: 2021-10-11 | Stop reason: SDUPTHER

## 2021-10-11 RX ADMIN — GABAPENTIN 600 MG: 300 CAPSULE ORAL at 14:01

## 2021-10-11 RX ADMIN — DEXTROAMPHETAMINE SACCHARATE, AMPHETAMINE ASPARTATE, DEXTROAMPHETAMINE SULFATE, AMPHETAMINE SULFATE TABLETS, 10 MG,CLL 20 MG: 2.5; 2.5; 2.5; 2.5 TABLET ORAL at 14:01

## 2021-10-11 RX ADMIN — INFLUENZA VIRUS VACCINE 0.5 ML: 15; 15; 15; 15 SUSPENSION INTRAMUSCULAR at 12:18

## 2021-10-11 RX ADMIN — BUPROPION HYDROCHLORIDE 150 MG: 150 TABLET, EXTENDED RELEASE ORAL at 09:07

## 2021-10-11 RX ADMIN — CLONAZEPAM 1 MG: 1 TABLET ORAL at 09:07

## 2021-10-11 RX ADMIN — DEXTROAMPHETAMINE SACCHARATE, AMPHETAMINE ASPARTATE, DEXTROAMPHETAMINE SULFATE, AMPHETAMINE SULFATE TABLETS, 10 MG,CLL 20 MG: 2.5; 2.5; 2.5; 2.5 TABLET ORAL at 09:07

## 2021-10-11 RX ADMIN — MAGNESIUM HYDROXIDE/ALUMINUM HYDROXICE/SIMETHICONE 30 ML: 120; 1200; 1200 SUSPENSION ORAL at 09:27

## 2021-10-11 RX ADMIN — BUSPIRONE HYDROCHLORIDE 10 MG: 10 TABLET ORAL at 09:07

## 2021-10-11 RX ADMIN — LEVETIRACETAM 250 MG: 250 TABLET ORAL at 09:07

## 2021-10-11 RX ADMIN — GABAPENTIN 600 MG: 300 CAPSULE ORAL at 09:07

## 2021-10-11 NOTE — BH NOTES
DISCHARGE SUMMARY    NAME:Cinda Miguel  : 1985  MRN: [id number]    The patient Alena Rolon exhibits the ability to control behavior in a less restrictive environment. Patient's level of functioning is improving. No assaultive/destructive behavior has been observed for the past 24 hours. No suicidal/homicidal threat or behavior has been observed for the past 24 hours. There is no evidence of serious medication side effects. Patient has not been in physical or protective restraints for at least the past 24 hours. If weapons involved, how are they secured? None    Is patient aware of and in agreement with discharge plan? Yes    Arrangements for medication:  Prescriptions given to patient, given a weeks supply or 30 day supply. Copy of discharge instructions to provider?:  Yes    Arrangements for transportation home:   all follow up appointments as scheduled, continue to take prescribed medications per physician instructions. Mental health crisis number:  566 or your local mental health crisis line number at [phone].       Mental Health Emergency WARM LINE      [phone] ([phone])      M-F: 9am to 9pm      Sat & Sun: 5pm  9pm  National suicide prevention lines:                             0-044-GPCFDCW ([phone])       4-938-137-TALK ([phone])    Crisis Text Line:  Text HOME to 756910

## 2021-10-11 NOTE — PROGRESS NOTES
Psychiatric Progress Note    Patient: Cindy Paige MRN: [id number]  SSN: [id number]    YOB: 1985  Age: 31 y.o. Sex: female      Admit Date: 10/1/2021       Subjective:     Cindy Paige  reports feeling j a little better. She was somewhat upset because tomorrow will be the ninth anniversary of when she and her  were  and a year from his death. Patient felt she needed some Ativan because she was going to be more nervous. We did however val the role of bupropion which we will add tomorrow morning. Denies SI/HI/AH/VH. No aggression or violence. Appropriately interactive and aware. Continues to report anxiety is her biggest problem    Case reviewed with nursing staff and no significant issues or events reported in the last 24 hours. Staff has observed patient interacting on the unit and attending group.       Objective:     Mental Status Exam:   Sensorium  Oriented to time, place, and situation   Relations Connects with interviewer and engages appropriately   Eye Contact Appropriate   Appearance:  Appropriate for venue and season   Speech:  Normal rate, tone, volume and pattern   Thought Process: Linear, logical and goal-directed   Thought Content Free of delusions, hallucinations and does not appear to be responding to internal stimuli   Suicidal ideations None active and contracts for safety   Mood:  Depressed but calm   Affect:  mood congruent   Memory    Intact   Concentration:  Intact   Insight:   Fair   Judgment:  Fair     No signs of tardive dyskinesia or extrapyramidal symptoms    MEDICATIONS:  Current Facility-Administered Medications   Medication Dose Route Frequency    QUEtiapine (SEROquel) tablet 25 mg  25 mg Oral DAILY    alum-mag hydroxide-simeth (MYLANTA) oral suspension 30 mL  30 mL Oral Q4H PRN    gabapentin (NEURONTIN) capsule 600 mg  600 mg Oral TID    dextroamphetamine-amphetamine (ADDERALL) tablet 20 mg  20 mg Oral BID  naproxen (NAPROSYN) tablet 375 mg  375 mg Oral Q8H PRN    OLANZapine (ZyPREXA) tablet 5 mg  5 mg Oral Q6H PRN    diphenhydrAMINE (BENADRYL) injection 50 mg  50 mg IntraMUSCular BID PRN    hydrOXYzine HCL (ATARAX) tablet 50 mg  50 mg Oral TID PRN    LORazepam (ATIVAN) injection 1 mg  1 mg IntraMUSCular Q4H PRN    traZODone (DESYREL) tablet 50 mg  50 mg Oral QHS PRN    acetaminophen (TYLENOL) tablet 650 mg  650 mg Oral Q4H PRN    magnesium hydroxide (MILK OF MAGNESIA) 400 mg/5 mL oral suspension 30 mL  30 mL Oral DAILY PRN    nicotine (NICODERM CQ) 21 mg/24 hr patch 1 Patch  1 Patch TransDERmal DAILY    levETIRAcetam (KEPPRA) tablet 250 mg  250 mg Oral BID    busPIRone (BUSPAR) tablet 10 mg  10 mg Oral BID    QUEtiapine (SEROquel) tablet 400 mg  400 mg Oral QHS    clonazePAM (KlonoPIN) tablet 1 mg  1 mg Oral BID    influenza vaccine 2021-22 (6 mos+)(PF) (FLUARIX/FLULAVAL/FLUZONE QUAD) injection 0.5 mL  1 Each IntraMUSCular PRIOR TO DISCHARGE            Assessment:     Principal Problem:    Severe episode of recurrent major depressive disorder ([facility] [address] (10/5/2021)    Active Problems:    Seizures, generalized convulsive ([facility] [address] (10/1/2021)      CATALINA (generalized anxiety disorder) (10/5/2021)      Suicidal ideation (10/5/2021)        Plan:     Continue current plan of care excepted as noted. Add bupropion 150mg SR p.o. daily    Continue to monitor for drug-seeking behavior especially benzodiazepines    Continue to participate in the milieu of activities and work towards discharge goals. Contracts for safety and has no immediate requests    Disposition planning with social work with the goal of a transition to an outpatient level of care. Patient would benefit from ongoing care as they have not yet reached their discharge goals are psychotropic medication optimization.      Tentative discharge  10/11/21    Signed By: Georgi Ge, PhD, PA-C, PsyCAQ    October 11, 2021

## 2021-10-11 NOTE — SUICIDE SAFETY PLAN
SAFETY PLAN    A suicide Safety Plan is a document that supports someone when they are having thoughts of suicide. Warning Signs that indicate a suicidal crisis may be developing: What (situations, thoughts, feelings, body sensations, behaviors, etc.) do you experience that lets you know you are beginning to think about suicide? 1. Not eating  2. Over sleeping  3. Not active    Internal Coping Strategies:  What things can I do (relaxation techniques, hobbies, physical activities, etc.) to take my mind off my problems without contacting another person? 1. Running  2. Music  3. Watching a movie    People and social settings that provide distraction: Who can I call or where can I go to distract me? 1. Name: Jitendra Master Phone: [phone]  2. Name: Dad  Phone: In [address] whom I can ask for help: Who can I call when I need help - for example, friends, family, clergy, someone else? 1. Name: Jitendra Master Phone: [phone]  2. Name: Dad  Phone: In Blank/Email/Call     Professionals or [address] [place] [address] I can contact during a crisis: Who can I call for help - for example, my doctor, my psychiatrist, my psychologist, a mental health provider, a suicide hotline? 1. Clinician Name: [facility]   Phone: [phone]      Clinician Pager or Emergency Contact #: [phone]    2. Clinician Name: Vinton Hodgkin, MD Phone: [phone]        3. Suicide Prevention Lifeline: 8-839-807-TALK (7773)    4. [address] [place] [address] Emergency Services -  for example, [address] [place] [address] suicide hotline, [facility] Hotline: [facility]    Emergency Services Phone: [phone]    Making the environment safe: How can I make my environment (house/apartment/living space) safer? For example, can I remove guns, medications, and other items? 1. Contact crisis if needed  2.  Be active

## 2021-10-11 NOTE — BH NOTES
Pt received in activity room watching TV,,Pt socializing withothers, smiling, ,  Denies SI/HI, Denies hallucinations. Rated depression as 8and anxiety as 7 no complaints with assessment made.     Pt attended. group, ate snack,Remained after group  In activity room watching Tv and socilaizing others      Pt was seen By MICHELLE Huber beginning of shift, no new order made as of this time, Pt came at 2030 asking if Order for Ativan prescribed by Susan Parry, in formd no order wtritten as of that time. At  in [employer]  when given her HS medciation  She asked again about Ativan informed that no order  Prescribed and already she is  On Clonazepam 1mg . Pt noticed to cheeked all her  Medications, I asked her to open her mouth and she cheeked the medication ,encourged to take her mediation she swallowed some and cheeked one round white tablet of Seroquel dose 400mg ( served in 2 tablets of 200mg) whe in formed again she still cheeking one tablet and f in formed her that if she  doesnt need it she can give it to me, so she  Gave it to me saying she thinks the Seroquel 400mg is too much to her. Then she became tearful and I walked with her to her room talked with her about the expected behavior of not cheeking her medication and if she need be reduced or changed she need discuss that with doctor. But this behavior is contradicting with her behavior of seeking more medication. She said tomorrow her    aniversary and was looking to have Ativan tomorrow. After that she  paced in hallway for some times. Then later spend angel in activity room, scolaizing with staff and others. Also notced that another r patient start to demand for medication when that patient in formed  That already she took medication when she walked away , female staff heared her telling Mehrdad Grady( he said no) in some way understood that she sending pt to ask for medication.     Also at 2250 walked behind another  2nd patient who demands for medication( that 2nd pt already took medication to help with anxiety and sleep) and Ameya Moore was behind her in line in hallway when that pt walked from the window Ameya Moore asked for medication to help with sleep, informed she took the HS medication which includes Seroquel which she cheeked and took partial dose, infomred that she need to be in bed and to give at least 2-3 hours to let medication work.        No siezure like activity, no  violent no self harming behavior noticed or reported.

## 2021-10-11 NOTE — PROGRESS NOTES
Psychiatric Progress Note    Patient: Alison Rosario MRN: [id number]  SSN: [id number]    YOB: 1985  Age: 31 y.o. Sex: female      Admit Date: 10/1/2021       Subjective:     Alison Rosario  reports feeling better although consistently seeking benzodiazepines or Ativan. She is exhibiting drug-seeking behavior. Progress is minimal and continually and I find reasons to limit her progress. Denies SI/HI/AH/VH. No aggression or violence. Appropriately interactive and aware. Continues to report anxiety is her biggest problem    Case reviewed with nursing staff and no significant issues or events reported in the last 24 hours. Staff has observed patient interacting on the unit and attending group.       Objective:     Mental Status Exam:   Sensorium  Oriented to time, place, and situation   Relations Connects with interviewer and engages appropriately   Eye Contact Appropriate   Appearance:  Appropriate for venue and season   Speech:  Normal rate, tone, volume and pattern   Thought Process: Linear, logical and goal-directed   Thought Content Free of delusions, hallucinations and does not appear to be responding to internal stimuli   Suicidal ideations None active and contracts for safety   Mood:  Depressed but calm   Affect:  mood congruent   Memory    Intact   Concentration:  Intact   Insight:   Fair   Judgment:  Fair     No signs of tardive dyskinesia or extrapyramidal symptoms    MEDICATIONS:  Current Facility-Administered Medications   Medication Dose Route Frequency    QUEtiapine (SEROquel) tablet 25 mg  25 mg Oral DAILY    alum-mag hydroxide-simeth (MYLANTA) oral suspension 30 mL  30 mL Oral Q4H PRN    gabapentin (NEURONTIN) capsule 600 mg  600 mg Oral TID    dextroamphetamine-amphetamine (ADDERALL) tablet 20 mg  20 mg Oral BID    naproxen (NAPROSYN) tablet 375 mg  375 mg Oral Q8H PRN    OLANZapine (ZyPREXA) tablet 5 mg  5 mg Oral Q6H PRN    diphenhydrAMINE (BENADRYL) injection 50 mg  50 mg IntraMUSCular BID PRN    hydrOXYzine HCL (ATARAX) tablet 50 mg  50 mg Oral TID PRN    LORazepam (ATIVAN) injection 1 mg  1 mg IntraMUSCular Q4H PRN    traZODone (DESYREL) tablet 50 mg  50 mg Oral QHS PRN    acetaminophen (TYLENOL) tablet 650 mg  650 mg Oral Q4H PRN    magnesium hydroxide (MILK OF MAGNESIA) 400 mg/5 mL oral suspension 30 mL  30 mL Oral DAILY PRN    nicotine (NICODERM CQ) 21 mg/24 hr patch 1 Patch  1 Patch TransDERmal DAILY    levETIRAcetam (KEPPRA) tablet 250 mg  250 mg Oral BID    busPIRone (BUSPAR) tablet 10 mg  10 mg Oral BID    QUEtiapine (SEROquel) tablet 400 mg  400 mg Oral QHS    clonazePAM (KlonoPIN) tablet 1 mg  1 mg Oral BID    influenza vaccine 2021-22 (6 mos+)(PF) (FLUARIX/FLULAVAL/FLUZONE QUAD) injection 0.5 mL  1 Each IntraMUSCular PRIOR TO DISCHARGE            Assessment:     Principal Problem:    Severe episode of recurrent major depressive disorder ([facility] [address] (10/5/2021)    Active Problems:    Seizures, generalized convulsive ([facility] [address] (10/1/2021)      CATALINA (generalized anxiety disorder) (10/5/2021)      Suicidal ideation (10/5/2021)        Plan:     Continue current plan of care excepted as noted. Monitor closely for drug-seeking    Continue to participate in the milieu of activities and work towards discharge goals. Contracts for safety and has no immediate requests    Disposition planning with social work with the goal of a transition to an outpatient level of care. Patient would benefit from ongoing care as they have not yet reached their discharge goals are psychotropic medication optimization.     Tentative discharge  TBD    Signed By: Rangel Whitney, PhD, PA-C, PsyCAQ    October 11, 2021

## 2021-10-11 NOTE — PROGRESS NOTES
Psychiatric Progress Note    Patient: Xiomara Driver MRN: [id number]  SSN: [id number]    YOB: 1985  Age: 31 y.o. Sex: female      Admit Date: 10/1/2021       Subjective:     Xiomara Driver  reports feeling just okay. When asked why she is feeling this way she really describes being anxious. Denies SI/HI/AH/VH. No aggression or violence. Appropriately interactive and aware. Continues to report anxiety is her biggest problem    Case reviewed with nursing staff and no significant issues or events reported in the last 24 hours. Staff has observed patient interacting on the unit and attending group.       Objective:     Mental Status Exam:   Sensorium  Oriented to time, place, and situation   Relations Connects with interviewer and engages appropriately   Eye Contact Appropriate   Appearance:  Appropriate for venue and season   Speech:  Normal rate, tone, volume and pattern   Thought Process: Linear, logical and goal-directed   Thought Content Free of delusions, hallucinations and does not appear to be responding to internal stimuli   Suicidal ideations None active and contracts for safety   Mood:  Depressed but calm   Affect:  mood congruent   Memory    Intact   Concentration:  Intact   Insight:   Fair   Judgment:  Fair     No signs of tardive dyskinesia or extrapyramidal symptoms    MEDICATIONS:  Current Facility-Administered Medications   Medication Dose Route Frequency    QUEtiapine (SEROquel) tablet 25 mg  25 mg Oral DAILY    alum-mag hydroxide-simeth (MYLANTA) oral suspension 30 mL  30 mL Oral Q4H PRN    gabapentin (NEURONTIN) capsule 600 mg  600 mg Oral TID    dextroamphetamine-amphetamine (ADDERALL) tablet 20 mg  20 mg Oral BID    naproxen (NAPROSYN) tablet 375 mg  375 mg Oral Q8H PRN    OLANZapine (ZyPREXA) tablet 5 mg  5 mg Oral Q6H PRN    diphenhydrAMINE (BENADRYL) injection 50 mg  50 mg IntraMUSCular BID PRN    hydrOXYzine HCL (ATARAX) tablet 50 mg  50 mg Oral TID PRN    LORazepam (ATIVAN) injection 1 mg  1 mg IntraMUSCular Q4H PRN    traZODone (DESYREL) tablet 50 mg  50 mg Oral QHS PRN    acetaminophen (TYLENOL) tablet 650 mg  650 mg Oral Q4H PRN    magnesium hydroxide (MILK OF MAGNESIA) 400 mg/5 mL oral suspension 30 mL  30 mL Oral DAILY PRN    nicotine (NICODERM CQ) 21 mg/24 hr patch 1 Patch  1 Patch TransDERmal DAILY    levETIRAcetam (KEPPRA) tablet 250 mg  250 mg Oral BID    busPIRone (BUSPAR) tablet 10 mg  10 mg Oral BID    QUEtiapine (SEROquel) tablet 400 mg  400 mg Oral QHS    clonazePAM (KlonoPIN) tablet 1 mg  1 mg Oral BID    influenza vaccine 2021-22 (6 mos+)(PF) (FLUARIX/FLULAVAL/FLUZONE QUAD) injection 0.5 mL  1 Each IntraMUSCular PRIOR TO DISCHARGE            Assessment:     Principal Problem:    Severe episode of recurrent major depressive disorder ([facility] [address] (10/5/2021)    Active Problems:    Seizures, generalized convulsive ([facility] [address] (10/1/2021)      CATALINA (generalized anxiety disorder) (10/5/2021)      Suicidal ideation (10/5/2021)        Plan:     Continue current plan of care excepted as noted. Monitor closely for drug-seeking    Continue to participate in the milieu of activities and work towards discharge goals. Contracts for safety and has no immediate requests    Disposition planning with social work with the goal of a transition to an outpatient level of care. Patient would benefit from ongoing care as they have not yet reached their discharge goals are psychotropic medication optimization.     Tentative discharge  TBD    Signed By: Brunilda Ybarra, PhD, PA-C, PsyCAQ    October 11, 2021

## 2021-10-11 NOTE — DISCHARGE INSTRUCTIONS
Patient Education        Depression and Chronic Disease: Care Instructions  Your Care Instructions     A chronic disease is one that you have for a long time. Some chronic diseases can be controlled, but they usually cannot be cured. Depression is common in people with chronic diseases, but it often goes unnoticed. Many people have concerns about seeking treatment for a mental health problem. You may think it's a sign of weakness, or you don't want people to know about it. It's important to overcome these reasons for not seeking treatment. Treating depression or anxiety is good for your health. Follow-up care is a key part of your treatment and safety. Be sure to make and go to all appointments, and call your doctor if you are having problems. It's also a good idea to know your test results and keep a list of the medicines you take. How can you care for yourself at home? Watch for symptoms of depression  The symptoms of depression are often subtle at first. You may think they are caused by your disease rather than depression. Or you may think it is normal to be depressed when you have a chronic disease. If you are depressed you may:  · Feel sad or hopeless. · Feel guilty or worthless. · Not enjoy the things you used to enjoy. · Feel hopeless, as though life is not worth living. · Have trouble thinking or remembering. · Have low energy, and you may not eat or sleep well. · Pull away from others. · Think often about death or killing yourself. (Keep the numbers for these national suicide hotlines: 1-267-194-TALK [1-995.794.6465] and 9-872-PJCUBML [1-602.544.9513]. )  Get treatment  By treating your depression, you can feel more hopeful and have more energy. If you feel better, you may take better care of yourself, so your health may improve. · Talk to your doctor if you have any changes in mood during treatment for your disease. · Ask your doctor for help.  Counseling, antidepressant medicine, or a combination of the two can help most people with depression. Often a combination works best. Counseling can also help you cope with having a chronic disease. When should you call for help? Call 911 anytime you think you may need emergency care. For example, call if:    · You feel like hurting yourself or someone else.     · Someone you know has depression and is about to attempt or is attempting suicide. Call your doctor now or seek immediate medical care if:    · You hear voices.     · Someone you know has depression and:  ? Starts to give away his or her possessions. ? Uses illegal drugs or drinks alcohol heavily. ? Talks or writes about death, including writing suicide notes or talking about guns, knives, or pills. ? Starts to spend a lot of time alone. ? Acts very aggressively or suddenly appears calm. Watch closely for changes in your health, and be sure to contact your doctor if:    · You do not get better as expected. Where can you learn more? Go to http://www.gray.com/  Enter A548 in the search box to learn more about \"Depression and Chronic Disease: Care Instructions. \"  Current as of: June 16, 2021               Content Version: 13.0  © 7714-8160 Health[employer], [employer]. Care instructions adapted under license by [employer] (which disclaims liability or warranty for this information). If you have questions about a medical condition or this instruction, always ask your healthcare professional. [place] [address] any warranty or liability for your use of this information.

## 2021-10-11 NOTE — BH NOTES
Pt discharged from [facility]. Pt is smiling, states she's ready. Pt is aware she is going to [employer]. Pt denies SI/HI. States if thoughts like that occur she will come to ER. Pt acknowledges discharge instructions, states she is aware of how to obtain her prescriptions. No s/s of distress noted. NO complaints of pain. Pt is ambulatory. Her belongings were returned to her. Pt was escorted off unit to ER entrance where she met her ride from  [facility].

## 2021-10-11 NOTE — BH NOTES
B: Pt is alert and oriented x3. Pt is cooperative with assessments. Pt reports feeling okay. Pt identifies they're doing better since their admission. Pt is able to identify personal coping alternatives: \"read, write (songs, poems), monopoly\". No s/s of distress noted. No complaints of pain. Pt verbalizes that her goalfor treatment is to become stable, not in/out of the hospital.  She states she wants to be able to handle things without patient help. Identifies that she struggles with grief and anxiety. Today is her wedding anniversary,  almost 8 years, He  Aug 3, 2020. Pt also would like to enventually regain custody of her daughter, Sarkis londono, who her 's family now has custody of. Pt states she feels if she doesn't get long-term help with substances, she will go off her medications again when living on street. I: Assess Pt mood. Document presence of depressive/anxiety symptoms. Assess for Audio/visual hallucinations. Establish trust.  Educate Pt on medications and disease processes. Monitor ADLs, food/fluid consumption and sleep. Encouarge group participation and socialization. Educate Pt on medications, coping alternatives, disease processes, and community resources. Safety checks. R: Pt mood is stable. Pt rates depression 0/10, identifies triggers as none. Pt rates anxiety at 0/10, identifies triggers as none. Pt denies SI. Pt denies HI. Pt denies audio/visual hallucinations, s/s are not observed by staff. Pt is attending groups and is interacting appropriately with staff/peers. Pt is eating all meals, and is maintaining their personal hygiene. Pt reports sleeping well. Pt is not compliant wiith medications; refusing seroquel. P: Educuate Pt on follow-up procedures, discharge education.

## 2021-10-11 NOTE — GROUP NOTE
[facility]  GROUP DOCUMENTATION INDIVIDUAL                                                                          Group Therapy Note    Date: 10/11/2021    Group Start Time: 4852  Group End Time: 1500  Group Topic: Education Group - Inpatient    [facility] [address] GROUP DOCUMENTATION GROUP    Group Therapy Note    SW facilitated psychoeducational group on Core Beliefs and negative thinking. Rumination was discussed. Group members shared their negative beliefs about self as willing. SW engaged group in experiential learning for thought defusion with Leaves on a Stream and My Observing Head. Attendance: Attended    Patient's Goal:  Attendance    Interventions/techniques: Informed, Validated and Supported    Follows Directions:  Followed directions    Interactions: Interacted appropriately    Mental Status: Calm and Congruent    Behavior/appearance: Attentive and Cooperative    Goals Achieved: Able to engage in interactions and Able to listen to others        ROSIE Selby, Supervisee in Social Work

## 2021-10-11 NOTE — BH NOTES
Pt slept overnight, got up at 04am asked for ice then slept. remained sleeping as of this time. No violent, no self harming behaviors, no seizure like activity noticed or reported.

## 2021-10-11 NOTE — GROUP NOTE
[facility]  GROUP DOCUMENTATION INDIVIDUAL                                                                          Group Therapy Note    Date: 10/11/2021    Group Start Time: 8229  Group End Time: 1100  Group Topic: Process Group - Inpatient    [facility] [address] [facility] GROUP DOCUMENTATION GROUP    Group Therapy Note    Group members participated in morning process group. SW facilitated goal identification, mood status, and discussion on the topic of needs. Group members defined what needs and wants were and identified their needs and wants as well as barriers. Attendance: Attended    Patient's Goal:  Attendance    Interventions/techniques: Informed, Validated and Supported    Follows Directions:  Followed directions    Interactions: Interacted appropriately    Mental Status: Calm and Congruent    Behavior/appearance: Attentive and Cooperative    Goals Achieved: Able to engage in interactions and Able to listen to others        ROSIE Rogers, Supervisee in Social Work

## 2021-10-12 RX ORDER — DEXTROAMPHETAMINE SACCHARATE, AMPHETAMINE ASPARTATE, DEXTROAMPHETAMINE SULFATE AND AMPHETAMINE SULFATE 5; 5; 5; 5 MG/1; MG/1; MG/1; MG/1
20 TABLET ORAL 2 TIMES DAILY
Qty: 60 TABLET | Refills: 0 | Status: SHIPPED | OUTPATIENT
Start: 2021-10-12

## 2021-10-12 RX ORDER — QUETIAPINE FUMARATE 25 MG/1
25 TABLET, FILM COATED ORAL DAILY
Qty: 30 TABLET | Refills: 0 | Status: SHIPPED | OUTPATIENT
Start: 2021-10-12

## 2021-10-12 RX ORDER — CLONAZEPAM 1 MG/1
1 TABLET ORAL 2 TIMES DAILY
Qty: 30 TABLET | Refills: 0 | Status: SHIPPED | OUTPATIENT
Start: 2021-10-12

## 2021-10-12 RX ORDER — BUSPIRONE HYDROCHLORIDE 10 MG/1
10 TABLET ORAL 2 TIMES DAILY
Qty: 60 TABLET | Refills: 0 | Status: SHIPPED | OUTPATIENT
Start: 2021-10-12

## 2021-10-12 RX ORDER — LEVETIRACETAM 250 MG/1
250 TABLET ORAL 2 TIMES DAILY
Qty: 60 TABLET | Refills: 0 | Status: SHIPPED | OUTPATIENT
Start: 2021-10-12

## 2021-10-12 RX ORDER — QUETIAPINE FUMARATE 400 MG/1
400 TABLET, FILM COATED ORAL
Qty: 30 TABLET | Refills: 0 | Status: SHIPPED | OUTPATIENT
Start: 2021-10-12

## 2021-10-12 RX ORDER — BUPROPION HYDROCHLORIDE 150 MG/1
150 TABLET, EXTENDED RELEASE ORAL DAILY
Qty: 30 TABLET | Refills: 0 | Status: SHIPPED | OUTPATIENT
Start: 2021-10-12

## 2021-10-12 RX ORDER — GABAPENTIN 300 MG/1
600 CAPSULE ORAL 3 TIMES DAILY
Qty: 90 CAPSULE | Refills: 0 | Status: SHIPPED | OUTPATIENT
Start: 2021-10-12

## 2022-01-04 NOTE — PROGRESS NOTES
Problem: Falls - Risk of  Goal: *Absence of Falls  Description: Document Navid Mariano Fall Risk and appropriate interventions in the flowsheet. Outcome: Progressing Towards Goal  Note: Fall Risk Interventions:            Medication Interventions: Teach patient to arise slowly    Elimination Interventions:  Toilet paper/wipes in reach              Problem: Depressed Mood (Adult/Pediatric)  Goal: *STG: Participates in treatment plan  Outcome: Progressing Towards Goal  Goal: *STG: Participates in 1:1 therapy sessions  Outcome: Progressing Towards Goal  Goal: *STG: Attends activities and groups  Outcome: Progressing Towards Goal  Goal: *STG: Remains safe in hospital  Outcome: Progressing Towards Goal  Goal: *STG: Complies with medication therapy  Outcome: Progressing Towards Goal     Problem: Anxiety  Goal: *Alleviation of anxiety  Outcome: Progressing Towards Goal  Goal: *Alleviation of anxiety (Palliative Care)  Outcome: Progressing Towards Goal Stable at this time.  Aerosolized treatments as needed.

## 2022-03-19 PROBLEM — R56.9 SEIZURES, GENERALIZED CONVULSIVE (HCC): Status: ACTIVE | Noted: 2021-10-01

## 2022-03-19 PROBLEM — R45.851 DEPRESSION WITH SUICIDAL IDEATION: Status: ACTIVE | Noted: 2021-10-01

## 2022-03-19 PROBLEM — F32.A DEPRESSION WITH SUICIDAL IDEATION: Status: ACTIVE | Noted: 2021-10-01

## 2022-03-19 PROBLEM — F33.2 SEVERE EPISODE OF RECURRENT MAJOR DEPRESSIVE DISORDER (HCC): Status: ACTIVE | Noted: 2021-10-05

## 2022-03-19 PROBLEM — F41.1 GAD (GENERALIZED ANXIETY DISORDER): Status: ACTIVE | Noted: 2021-10-05
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
## 2023-05-23 RX ORDER — CLONIDINE HYDROCHLORIDE 0.1 MG/1
0.1 TABLET ORAL
COMMUNITY

## 2023-05-23 RX ORDER — CLONAZEPAM 1 MG/1
1 TABLET ORAL 2 TIMES DAILY
COMMUNITY
Start: 2021-10-12

## 2023-05-23 RX ORDER — QUETIAPINE FUMARATE 400 MG/1
400 TABLET, FILM COATED ORAL
COMMUNITY
Start: 2021-10-12

## 2023-05-23 RX ORDER — QUETIAPINE FUMARATE 25 MG/1
25 TABLET, FILM COATED ORAL DAILY
COMMUNITY
Start: 2021-10-12

## 2023-05-23 RX ORDER — GABAPENTIN 300 MG/1
600 CAPSULE ORAL 3 TIMES DAILY
COMMUNITY
Start: 2021-10-12

## 2023-05-23 RX ORDER — OXCARBAZEPINE 150 MG/1
300 TABLET, FILM COATED ORAL 2 TIMES DAILY
COMMUNITY

## 2023-05-23 RX ORDER — DEXTROAMPHETAMINE SACCHARATE, AMPHETAMINE ASPARTATE, DEXTROAMPHETAMINE SULFATE AND AMPHETAMINE SULFATE 5; 5; 5; 5 MG/1; MG/1; MG/1; MG/1
20 TABLET ORAL 2 TIMES DAILY
COMMUNITY
Start: 2021-10-12

## 2023-05-23 RX ORDER — BUSPIRONE HYDROCHLORIDE 10 MG/1
10 TABLET ORAL 2 TIMES DAILY
COMMUNITY
Start: 2021-10-12

## 2023-05-23 RX ORDER — BENZTROPINE MESYLATE 0.5 MG/1
0.5 TABLET ORAL 2 TIMES DAILY
COMMUNITY

## 2023-05-23 RX ORDER — BUPROPION HYDROCHLORIDE 150 MG/1
150 TABLET, EXTENDED RELEASE ORAL DAILY
COMMUNITY
Start: 2021-10-12

## 2023-05-23 RX ORDER — LEVETIRACETAM 250 MG/1
250 TABLET ORAL 2 TIMES DAILY
COMMUNITY
Start: 2021-10-12